# Patient Record
Sex: FEMALE | Race: WHITE | NOT HISPANIC OR LATINO
[De-identification: names, ages, dates, MRNs, and addresses within clinical notes are randomized per-mention and may not be internally consistent; named-entity substitution may affect disease eponyms.]

---

## 2017-03-23 ENCOUNTER — APPOINTMENT (OUTPATIENT)
Dept: OBGYN | Facility: CLINIC | Age: 42
End: 2017-03-23

## 2017-03-23 VITALS
HEIGHT: 65 IN | SYSTOLIC BLOOD PRESSURE: 150 MMHG | BODY MASS INDEX: 29.49 KG/M2 | DIASTOLIC BLOOD PRESSURE: 90 MMHG | WEIGHT: 177 LBS

## 2017-03-28 LAB — HPV HIGH+LOW RISK DNA PNL CVX: NEGATIVE

## 2017-03-30 LAB — CYTOLOGY CVX/VAG DOC THIN PREP: NORMAL

## 2017-05-11 ENCOUNTER — FORM ENCOUNTER (OUTPATIENT)
Age: 42
End: 2017-05-11

## 2017-05-12 ENCOUNTER — APPOINTMENT (OUTPATIENT)
Dept: MAMMOGRAPHY | Facility: CLINIC | Age: 42
End: 2017-05-12

## 2017-05-12 ENCOUNTER — OUTPATIENT (OUTPATIENT)
Dept: OUTPATIENT SERVICES | Facility: HOSPITAL | Age: 42
LOS: 1 days | End: 2017-05-12
Payer: COMMERCIAL

## 2017-05-12 DIAGNOSIS — Z00.8 ENCOUNTER FOR OTHER GENERAL EXAMINATION: ICD-10-CM

## 2017-05-12 PROCEDURE — 77067 SCR MAMMO BI INCL CAD: CPT

## 2017-05-12 PROCEDURE — 77063 BREAST TOMOSYNTHESIS BI: CPT

## 2018-05-11 ENCOUNTER — APPOINTMENT (OUTPATIENT)
Dept: OBGYN | Facility: CLINIC | Age: 43
End: 2018-05-11
Payer: COMMERCIAL

## 2018-05-11 VITALS
WEIGHT: 192 LBS | HEIGHT: 65 IN | SYSTOLIC BLOOD PRESSURE: 112 MMHG | BODY MASS INDEX: 31.99 KG/M2 | DIASTOLIC BLOOD PRESSURE: 78 MMHG

## 2018-05-11 PROCEDURE — 99396 PREV VISIT EST AGE 40-64: CPT

## 2018-05-14 LAB — HPV HIGH+LOW RISK DNA PNL CVX: NOT DETECTED

## 2018-05-16 LAB — CYTOLOGY CVX/VAG DOC THIN PREP: NORMAL

## 2018-05-31 ENCOUNTER — FORM ENCOUNTER (OUTPATIENT)
Age: 43
End: 2018-05-31

## 2018-06-01 ENCOUNTER — APPOINTMENT (OUTPATIENT)
Dept: MAMMOGRAPHY | Facility: CLINIC | Age: 43
End: 2018-06-01

## 2018-06-01 ENCOUNTER — OUTPATIENT (OUTPATIENT)
Dept: OUTPATIENT SERVICES | Facility: HOSPITAL | Age: 43
LOS: 1 days | End: 2018-06-01
Payer: COMMERCIAL

## 2018-06-01 DIAGNOSIS — Z12.31 ENCOUNTER FOR SCREENING MAMMOGRAM FOR MALIGNANT NEOPLASM OF BREAST: ICD-10-CM

## 2018-06-01 PROCEDURE — 77067 SCR MAMMO BI INCL CAD: CPT | Mod: 26

## 2018-06-01 PROCEDURE — 77063 BREAST TOMOSYNTHESIS BI: CPT

## 2018-06-01 PROCEDURE — 77067 SCR MAMMO BI INCL CAD: CPT

## 2018-06-01 PROCEDURE — 77063 BREAST TOMOSYNTHESIS BI: CPT | Mod: 26

## 2019-05-14 ENCOUNTER — APPOINTMENT (OUTPATIENT)
Dept: OBGYN | Facility: CLINIC | Age: 44
End: 2019-05-14
Payer: COMMERCIAL

## 2019-05-14 VITALS
BODY MASS INDEX: 31.49 KG/M2 | DIASTOLIC BLOOD PRESSURE: 70 MMHG | SYSTOLIC BLOOD PRESSURE: 120 MMHG | WEIGHT: 189 LBS | HEIGHT: 65 IN

## 2019-05-14 PROCEDURE — 99396 PREV VISIT EST AGE 40-64: CPT

## 2019-05-14 NOTE — PHYSICAL EXAM
[Awake] : awake [Alert] : alert [Mass] : no breast mass [Acute Distress] : no acute distress [Nipple Discharge] : no nipple discharge [Axillary LAD] : no axillary lymphadenopathy [Soft] : soft [Tender] : non tender [Oriented x3] : oriented to person, place, and time [Normal] : uterus [No Bleeding] : there was no active vaginal bleeding [Uterine Adnexae] : were not tender and not enlarged [RRR, No Murmurs] : RRR, no murmurs [CTAB] : CTAB

## 2019-05-15 LAB — HPV HIGH+LOW RISK DNA PNL CVX: NOT DETECTED

## 2019-05-20 LAB — CYTOLOGY CVX/VAG DOC THIN PREP: NORMAL

## 2019-06-12 ENCOUNTER — FORM ENCOUNTER (OUTPATIENT)
Age: 44
End: 2019-06-12

## 2019-06-13 ENCOUNTER — OUTPATIENT (OUTPATIENT)
Dept: OUTPATIENT SERVICES | Facility: HOSPITAL | Age: 44
LOS: 1 days | End: 2019-06-13
Payer: COMMERCIAL

## 2019-06-13 ENCOUNTER — APPOINTMENT (OUTPATIENT)
Dept: MAMMOGRAPHY | Facility: CLINIC | Age: 44
End: 2019-06-13
Payer: COMMERCIAL

## 2019-06-13 DIAGNOSIS — Z12.31 ENCOUNTER FOR SCREENING MAMMOGRAM FOR MALIGNANT NEOPLASM OF BREAST: ICD-10-CM

## 2019-06-13 PROCEDURE — 77067 SCR MAMMO BI INCL CAD: CPT

## 2019-06-13 PROCEDURE — 77067 SCR MAMMO BI INCL CAD: CPT | Mod: 26

## 2019-06-13 PROCEDURE — 77063 BREAST TOMOSYNTHESIS BI: CPT

## 2019-06-13 PROCEDURE — 77063 BREAST TOMOSYNTHESIS BI: CPT | Mod: 26

## 2020-07-27 ENCOUNTER — APPOINTMENT (OUTPATIENT)
Dept: OBGYN | Facility: CLINIC | Age: 45
End: 2020-07-27
Payer: COMMERCIAL

## 2020-07-27 VITALS
SYSTOLIC BLOOD PRESSURE: 120 MMHG | BODY MASS INDEX: 31.49 KG/M2 | HEIGHT: 65 IN | WEIGHT: 189 LBS | DIASTOLIC BLOOD PRESSURE: 70 MMHG

## 2020-07-27 DIAGNOSIS — B37.2 CANDIDIASIS OF SKIN AND NAIL: ICD-10-CM

## 2020-07-27 PROCEDURE — 99396 PREV VISIT EST AGE 40-64: CPT

## 2020-07-27 PROCEDURE — 99214 OFFICE O/P EST MOD 30 MIN: CPT | Mod: 25

## 2020-07-27 RX ORDER — CLOTRIMAZOLE AND BETAMETHASONE DIPROPIONATE 10; .5 MG/G; MG/G
1-0.05 CREAM TOPICAL TWICE DAILY
Qty: 1 | Refills: 1 | Status: ACTIVE | COMMUNITY
Start: 2020-07-27 | End: 1900-01-01

## 2020-07-27 NOTE — PHYSICAL EXAM
[Acute Distress] : no acute distress [Awake] : awake [Alert] : alert [Mass] : no breast mass [Tender] : non tender [Axillary LAD] : no axillary lymphadenopathy [Soft] : soft [Nipple Discharge] : no nipple discharge [Oriented x3] : oriented to person, place, and time [Normal] : cervix [No Bleeding] : there was no active vaginal bleeding [Uterine Adnexae] : were not tender and not enlarged [CTAB] : CTAB [RRR, No Murmurs] : RRR, no murmurs

## 2020-07-30 LAB — HPV HIGH+LOW RISK DNA PNL CVX: NOT DETECTED

## 2020-08-06 LAB — CYTOLOGY CVX/VAG DOC THIN PREP: NORMAL

## 2020-08-20 ENCOUNTER — APPOINTMENT (OUTPATIENT)
Dept: OBGYN | Facility: CLINIC | Age: 45
End: 2020-08-20
Payer: COMMERCIAL

## 2020-08-20 ENCOUNTER — ASOB RESULT (OUTPATIENT)
Age: 45
End: 2020-08-20

## 2020-08-20 VITALS
WEIGHT: 193 LBS | DIASTOLIC BLOOD PRESSURE: 87 MMHG | SYSTOLIC BLOOD PRESSURE: 147 MMHG | HEIGHT: 65 IN | BODY MASS INDEX: 32.15 KG/M2

## 2020-08-20 DIAGNOSIS — N84.0 POLYP OF CORPUS UTERI: ICD-10-CM

## 2020-08-20 PROCEDURE — 76830 TRANSVAGINAL US NON-OB: CPT

## 2020-08-20 PROCEDURE — 76857 US EXAM PELVIC LIMITED: CPT | Mod: 59

## 2020-08-20 PROCEDURE — 58558Z: CUSTOM

## 2020-08-20 PROCEDURE — 99213 OFFICE O/P EST LOW 20 MIN: CPT | Mod: 25

## 2020-08-20 NOTE — PHYSICAL EXAM
[Normal] : cervix [No Bleeding] : there was no active vaginal bleeding [Uterine Adnexae] : were not tender and not enlarged [Enlarged ___ wks] : enlarged [unfilled] ~Uweeks

## 2020-08-20 NOTE — PROCEDURE
[Risks] : risks [Endometrial Biopsy] : Endometrial biopsy [Benefits] : benefits [Patient] : patient [Alternatives] : alternatives [Infection] : infection [Bleeding] : bleeding [Uterine Perforation] : uterine perforation [Allergic Reaction] : allergic reaction [Pain] : pain [CONSENT OBTAINED] : written consent was obtained prior to the procedure. [Neg Pregnancy Test] : a pregnancy test was negative [Paracervical Block] : A paracervical block was performed using [Betadine] : Betadine [1%] : 1% [Without Epi] : without epinephrine [Anteverted] : anteverted [Pipelle] : a Pipelle endometrial suction curette [Tenaculum] : a single toothed tenaculum [Tolerated Well] : the patient tolerated the procedure well [Sent to Histology] : the specimen was place in buffered formalin and sent for pathlogy [No Complications] : there were no complications

## 2020-09-01 LAB — CORE LAB BIOPSY: NORMAL

## 2020-09-28 ENCOUNTER — APPOINTMENT (OUTPATIENT)
Dept: ULTRASOUND IMAGING | Facility: CLINIC | Age: 45
End: 2020-09-28
Payer: COMMERCIAL

## 2020-09-28 ENCOUNTER — APPOINTMENT (OUTPATIENT)
Dept: MAMMOGRAPHY | Facility: CLINIC | Age: 45
End: 2020-09-28
Payer: COMMERCIAL

## 2020-09-28 ENCOUNTER — RESULT REVIEW (OUTPATIENT)
Age: 45
End: 2020-09-28

## 2020-09-28 ENCOUNTER — OUTPATIENT (OUTPATIENT)
Dept: OUTPATIENT SERVICES | Facility: HOSPITAL | Age: 45
LOS: 1 days | End: 2020-09-28
Payer: COMMERCIAL

## 2020-09-28 DIAGNOSIS — R92.8 OTHER ABNORMAL AND INCONCLUSIVE FINDINGS ON DIAGNOSTIC IMAGING OF BREAST: ICD-10-CM

## 2020-09-28 PROCEDURE — 77063 BREAST TOMOSYNTHESIS BI: CPT

## 2020-09-28 PROCEDURE — 77063 BREAST TOMOSYNTHESIS BI: CPT | Mod: 26

## 2020-09-28 PROCEDURE — 77067 SCR MAMMO BI INCL CAD: CPT | Mod: 26

## 2020-09-28 PROCEDURE — 77067 SCR MAMMO BI INCL CAD: CPT

## 2021-06-04 ENCOUNTER — NON-APPOINTMENT (OUTPATIENT)
Age: 46
End: 2021-06-04

## 2021-06-04 ENCOUNTER — APPOINTMENT (OUTPATIENT)
Dept: FAMILY MEDICINE | Facility: CLINIC | Age: 46
End: 2021-06-04
Payer: COMMERCIAL

## 2021-06-04 PROCEDURE — 99202 OFFICE O/P NEW SF 15 MIN: CPT | Mod: 95

## 2021-06-04 NOTE — HISTORY OF PRESENT ILLNESS
[Home] : at home, [unfilled] , at the time of the visit. [Medical Office: ([facility])___] : at the medical office located in  [Verbal consent obtained from patient] : the patient, [unfilled] [FreeTextEntry8] : Patient states that she has been having difficulty coping with the stressors of 3 children, a full time job, and managing the household. Patient has been seeing psych NP weekly, and getting her medications adjusted. states she feels well physically, but is considering taking time from work

## 2021-11-02 ENCOUNTER — APPOINTMENT (OUTPATIENT)
Dept: OBGYN | Facility: CLINIC | Age: 46
End: 2021-11-02
Payer: COMMERCIAL

## 2021-11-02 VITALS
WEIGHT: 188 LBS | BODY MASS INDEX: 31.32 KG/M2 | HEIGHT: 65 IN | SYSTOLIC BLOOD PRESSURE: 140 MMHG | DIASTOLIC BLOOD PRESSURE: 84 MMHG

## 2021-11-02 DIAGNOSIS — R92.2 INCONCLUSIVE MAMMOGRAM: ICD-10-CM

## 2021-11-02 PROCEDURE — 99396 PREV VISIT EST AGE 40-64: CPT

## 2021-11-03 NOTE — DISCUSSION/SUMMARY
[FreeTextEntry1] : annual exam. pap/hpv done\par mother had br ca  in 40s, pts tyrer cuscick risk is elevated. dw pt considertion of yearly screening mris and poss risk contrast for mri. Pt declines mri for now, will get mammo and us for dense breast tissue. \par had isolated episode midcycle vb, advised to rto if she has anymore for us, endo bx .

## 2021-11-03 NOTE — HISTORY OF PRESENT ILLNESS
[FreeTextEntry1] : 45 yo p3 here for annual exam. no gyn co. periods monthly, not as heavy. had one episode of midcycle vb several months ago.

## 2021-11-05 LAB — HPV HIGH+LOW RISK DNA PNL CVX: NOT DETECTED

## 2021-11-09 LAB — CYTOLOGY CVX/VAG DOC THIN PREP: ABNORMAL

## 2021-12-06 ENCOUNTER — APPOINTMENT (OUTPATIENT)
Dept: MAMMOGRAPHY | Facility: CLINIC | Age: 46
End: 2021-12-06
Payer: COMMERCIAL

## 2021-12-06 ENCOUNTER — RESULT REVIEW (OUTPATIENT)
Age: 46
End: 2021-12-06

## 2021-12-06 ENCOUNTER — TRANSCRIPTION ENCOUNTER (OUTPATIENT)
Age: 46
End: 2021-12-06

## 2021-12-06 ENCOUNTER — OUTPATIENT (OUTPATIENT)
Dept: OUTPATIENT SERVICES | Facility: HOSPITAL | Age: 46
LOS: 1 days | End: 2021-12-06
Payer: COMMERCIAL

## 2021-12-06 ENCOUNTER — APPOINTMENT (OUTPATIENT)
Dept: ULTRASOUND IMAGING | Facility: CLINIC | Age: 46
End: 2021-12-06
Payer: COMMERCIAL

## 2021-12-06 DIAGNOSIS — Z00.00 ENCOUNTER FOR GENERAL ADULT MEDICAL EXAMINATION WITHOUT ABNORMAL FINDINGS: ICD-10-CM

## 2021-12-06 DIAGNOSIS — R92.2 INCONCLUSIVE MAMMOGRAM: ICD-10-CM

## 2021-12-06 PROCEDURE — 77063 BREAST TOMOSYNTHESIS BI: CPT | Mod: 26

## 2021-12-06 PROCEDURE — 77067 SCR MAMMO BI INCL CAD: CPT

## 2021-12-06 PROCEDURE — 77063 BREAST TOMOSYNTHESIS BI: CPT

## 2021-12-06 PROCEDURE — 77067 SCR MAMMO BI INCL CAD: CPT | Mod: 26

## 2021-12-06 PROCEDURE — 76641 ULTRASOUND BREAST COMPLETE: CPT

## 2021-12-06 PROCEDURE — 76641 ULTRASOUND BREAST COMPLETE: CPT | Mod: 26,50

## 2022-01-20 ENCOUNTER — RESULT REVIEW (OUTPATIENT)
Age: 47
End: 2022-01-20

## 2022-01-21 ENCOUNTER — APPOINTMENT (OUTPATIENT)
Dept: DERMATOLOGY | Facility: CLINIC | Age: 47
End: 2022-01-21
Payer: COMMERCIAL

## 2022-01-21 PROCEDURE — 11306 SHAVE SKIN LESION 0.6-1.0 CM: CPT

## 2022-01-21 PROCEDURE — 99202 OFFICE O/P NEW SF 15 MIN: CPT | Mod: 25

## 2022-01-21 PROCEDURE — 11900 INJECT SKIN LESIONS </W 7: CPT | Mod: 59

## 2022-03-06 ENCOUNTER — RESULT REVIEW (OUTPATIENT)
Age: 47
End: 2022-03-06

## 2022-03-07 ENCOUNTER — APPOINTMENT (OUTPATIENT)
Dept: DERMATOLOGY | Facility: CLINIC | Age: 47
End: 2022-03-07
Payer: COMMERCIAL

## 2022-03-07 ENCOUNTER — NON-APPOINTMENT (OUTPATIENT)
Age: 47
End: 2022-03-07

## 2022-03-07 DIAGNOSIS — D48.5 NEOPLASM OF UNCERTAIN BEHAVIOR OF SKIN: ICD-10-CM

## 2022-03-07 DIAGNOSIS — L72.0 EPIDERMAL CYST: ICD-10-CM

## 2022-03-07 PROCEDURE — 12042 INTMD RPR N-HF/GENIT2.6-7.5: CPT

## 2022-03-07 PROCEDURE — 11423 EXC H-F-NK-SP B9+MARG 2.1-3: CPT

## 2022-03-15 ENCOUNTER — NON-APPOINTMENT (OUTPATIENT)
Age: 47
End: 2022-03-15

## 2022-05-01 ENCOUNTER — INPATIENT (INPATIENT)
Facility: HOSPITAL | Age: 47
LOS: 2 days | Discharge: HOME | DRG: 918 | End: 2022-05-04
Attending: STUDENT IN AN ORGANIZED HEALTH CARE EDUCATION/TRAINING PROGRAM | Admitting: HOSPITALIST
Payer: COMMERCIAL

## 2022-05-01 VITALS
RESPIRATION RATE: 18 BRPM | DIASTOLIC BLOOD PRESSURE: 124 MMHG | WEIGHT: 181.66 LBS | OXYGEN SATURATION: 100 % | TEMPERATURE: 99 F | HEART RATE: 98 BPM | HEIGHT: 65 IN | SYSTOLIC BLOOD PRESSURE: 180 MMHG

## 2022-05-01 DIAGNOSIS — T56.891A TOXIC EFFECT OF OTHER METALS, ACCIDENTAL (UNINTENTIONAL), INITIAL ENCOUNTER: ICD-10-CM

## 2022-05-01 DIAGNOSIS — Z98.890 OTHER SPECIFIED POSTPROCEDURAL STATES: Chronic | ICD-10-CM

## 2022-05-01 LAB
ALBUMIN SERPL ELPH-MCNC: 4.7 G/DL — SIGNIFICANT CHANGE UP (ref 3.3–5.2)
ALP SERPL-CCNC: 76 U/L — SIGNIFICANT CHANGE UP (ref 40–120)
ALT FLD-CCNC: 14 U/L — SIGNIFICANT CHANGE UP
ANION GAP SERPL CALC-SCNC: 12 MMOL/L — SIGNIFICANT CHANGE UP (ref 5–17)
ANION GAP SERPL CALC-SCNC: 9 MMOL/L — SIGNIFICANT CHANGE UP (ref 5–17)
APAP SERPL-MCNC: <3 UG/ML — LOW (ref 10–26)
APPEARANCE UR: CLEAR — SIGNIFICANT CHANGE UP
AST SERPL-CCNC: 17 U/L — SIGNIFICANT CHANGE UP
BACTERIA # UR AUTO: ABNORMAL
BILIRUB SERPL-MCNC: 0.4 MG/DL — SIGNIFICANT CHANGE UP (ref 0.4–2)
BILIRUB UR-MCNC: NEGATIVE — SIGNIFICANT CHANGE UP
BUN SERPL-MCNC: 11.3 MG/DL — SIGNIFICANT CHANGE UP (ref 8–20)
BUN SERPL-MCNC: 14.6 MG/DL — SIGNIFICANT CHANGE UP (ref 8–20)
CALCIUM SERPL-MCNC: 11.1 MG/DL — HIGH (ref 8.6–10.2)
CALCIUM SERPL-MCNC: 9.8 MG/DL — SIGNIFICANT CHANGE UP (ref 8.6–10.2)
CHLORIDE SERPL-SCNC: 104 MMOL/L — SIGNIFICANT CHANGE UP (ref 98–107)
CHLORIDE SERPL-SCNC: 110 MMOL/L — HIGH (ref 98–107)
CHLORIDE UR-SCNC: 28 MMOL/L — SIGNIFICANT CHANGE UP
CO2 SERPL-SCNC: 23 MMOL/L — SIGNIFICANT CHANGE UP (ref 22–29)
CO2 SERPL-SCNC: 23 MMOL/L — SIGNIFICANT CHANGE UP (ref 22–29)
COLOR SPEC: YELLOW — SIGNIFICANT CHANGE UP
CREAT ?TM UR-MCNC: 70 MG/DL — SIGNIFICANT CHANGE UP
CREAT SERPL-MCNC: 0.9 MG/DL — SIGNIFICANT CHANGE UP (ref 0.5–1.3)
CREAT SERPL-MCNC: 1.02 MG/DL — SIGNIFICANT CHANGE UP (ref 0.5–1.3)
DIFF PNL FLD: NEGATIVE — SIGNIFICANT CHANGE UP
EGFR: 68 ML/MIN/1.73M2 — SIGNIFICANT CHANGE UP
EGFR: 79 ML/MIN/1.73M2 — SIGNIFICANT CHANGE UP
EPI CELLS # UR: SIGNIFICANT CHANGE UP
GLUCOSE SERPL-MCNC: 109 MG/DL — HIGH (ref 70–99)
GLUCOSE SERPL-MCNC: 83 MG/DL — SIGNIFICANT CHANGE UP (ref 70–99)
GLUCOSE UR QL: NEGATIVE MG/DL — SIGNIFICANT CHANGE UP
HCT VFR BLD CALC: 46.4 % — HIGH (ref 34.5–45)
HGB BLD-MCNC: 14.2 G/DL — SIGNIFICANT CHANGE UP (ref 11.5–15.5)
KETONES UR-MCNC: NEGATIVE — SIGNIFICANT CHANGE UP
LEUKOCYTE ESTERASE UR-ACNC: ABNORMAL
LITHIUM SERPL-MCNC: 1.27 MMOL/L — SIGNIFICANT CHANGE UP (ref 0.5–1.5)
LITHIUM SERPL-MCNC: 1.43 MMOL/L — SIGNIFICANT CHANGE UP (ref 0.5–1.5)
LITHIUM SERPL-MCNC: 1.62 MMOL/L — CRITICAL HIGH (ref 0.5–1.5)
MCHC RBC-ENTMCNC: 27.8 PG — SIGNIFICANT CHANGE UP (ref 27–34)
MCHC RBC-ENTMCNC: 30.6 GM/DL — LOW (ref 32–36)
MCV RBC AUTO: 90.8 FL — SIGNIFICANT CHANGE UP (ref 80–100)
NITRITE UR-MCNC: NEGATIVE — SIGNIFICANT CHANGE UP
OSMOLALITY UR: 269 MOSM/KG — LOW (ref 300–1000)
PH UR: 6.5 — SIGNIFICANT CHANGE UP (ref 5–8)
PLATELET # BLD AUTO: 230 K/UL — SIGNIFICANT CHANGE UP (ref 150–400)
POTASSIUM SERPL-MCNC: 3.8 MMOL/L — SIGNIFICANT CHANGE UP (ref 3.5–5.3)
POTASSIUM SERPL-MCNC: 3.9 MMOL/L — SIGNIFICANT CHANGE UP (ref 3.5–5.3)
POTASSIUM SERPL-SCNC: 3.8 MMOL/L — SIGNIFICANT CHANGE UP (ref 3.5–5.3)
POTASSIUM SERPL-SCNC: 3.9 MMOL/L — SIGNIFICANT CHANGE UP (ref 3.5–5.3)
POTASSIUM UR-SCNC: 16 MMOL/L — SIGNIFICANT CHANGE UP
PROT ?TM UR-MCNC: 9 MG/DL — SIGNIFICANT CHANGE UP (ref 0–12)
PROT SERPL-MCNC: 7.9 G/DL — SIGNIFICANT CHANGE UP (ref 6.6–8.7)
PROT UR-MCNC: NEGATIVE — SIGNIFICANT CHANGE UP
PROT/CREAT UR-RTO: 0.1 RATIO — SIGNIFICANT CHANGE UP
RBC # BLD: 5.11 M/UL — SIGNIFICANT CHANGE UP (ref 3.8–5.2)
RBC # FLD: 19.7 % — HIGH (ref 10.3–14.5)
RBC CASTS # UR COMP ASSIST: NEGATIVE /HPF — SIGNIFICANT CHANGE UP (ref 0–4)
SALICYLATES SERPL-MCNC: <0.6 MG/DL — LOW (ref 10–20)
SODIUM SERPL-SCNC: 139 MMOL/L — SIGNIFICANT CHANGE UP (ref 135–145)
SODIUM SERPL-SCNC: 142 MMOL/L — SIGNIFICANT CHANGE UP (ref 135–145)
SODIUM UR-SCNC: <30 MMOL/L — SIGNIFICANT CHANGE UP
SP GR SPEC: 1.01 — SIGNIFICANT CHANGE UP (ref 1.01–1.02)
TSH SERPL-MCNC: 1.28 UIU/ML — SIGNIFICANT CHANGE UP (ref 0.27–4.2)
UROBILINOGEN FLD QL: NEGATIVE MG/DL — SIGNIFICANT CHANGE UP
WBC # BLD: 6.46 K/UL — SIGNIFICANT CHANGE UP (ref 3.8–10.5)
WBC # FLD AUTO: 6.46 K/UL — SIGNIFICANT CHANGE UP (ref 3.8–10.5)
WBC UR QL: SIGNIFICANT CHANGE UP /HPF (ref 0–5)

## 2022-05-01 PROCEDURE — 99255 IP/OBS CONSLTJ NEW/EST HI 80: CPT

## 2022-05-01 PROCEDURE — 93010 ELECTROCARDIOGRAM REPORT: CPT

## 2022-05-01 PROCEDURE — 99285 EMERGENCY DEPT VISIT HI MDM: CPT

## 2022-05-01 PROCEDURE — 99223 1ST HOSP IP/OBS HIGH 75: CPT

## 2022-05-01 RX ORDER — SOD SULF/SODIUM/NAHCO3/KCL/PEG
4000 SOLUTION, RECONSTITUTED, ORAL ORAL ONCE
Refills: 0 | Status: COMPLETED | OUTPATIENT
Start: 2022-05-01 | End: 2022-05-01

## 2022-05-01 RX ORDER — LURASIDONE HYDROCHLORIDE 40 MG/1
0 TABLET ORAL
Qty: 0 | Refills: 0 | DISCHARGE

## 2022-05-01 RX ORDER — LANOLIN ALCOHOL/MO/W.PET/CERES
3 CREAM (GRAM) TOPICAL AT BEDTIME
Refills: 0 | Status: DISCONTINUED | OUTPATIENT
Start: 2022-05-01 | End: 2022-05-04

## 2022-05-01 RX ORDER — SERTRALINE 25 MG/1
0 TABLET, FILM COATED ORAL
Qty: 0 | Refills: 0 | DISCHARGE

## 2022-05-01 RX ORDER — ACETAMINOPHEN 500 MG
650 TABLET ORAL EVERY 6 HOURS
Refills: 0 | Status: DISCONTINUED | OUTPATIENT
Start: 2022-05-01 | End: 2022-05-04

## 2022-05-01 RX ORDER — LOSARTAN POTASSIUM 100 MG/1
50 TABLET, FILM COATED ORAL DAILY
Refills: 0 | Status: DISCONTINUED | OUTPATIENT
Start: 2022-05-01 | End: 2022-05-04

## 2022-05-01 RX ORDER — SODIUM CHLORIDE 9 MG/ML
2000 INJECTION INTRAMUSCULAR; INTRAVENOUS; SUBCUTANEOUS ONCE
Refills: 0 | Status: COMPLETED | OUTPATIENT
Start: 2022-05-01 | End: 2022-05-01

## 2022-05-01 RX ORDER — LITHIUM CARBONATE 300 MG/1
0 TABLET, EXTENDED RELEASE ORAL
Qty: 0 | Refills: 0 | DISCHARGE

## 2022-05-01 RX ORDER — ONDANSETRON 8 MG/1
4 TABLET, FILM COATED ORAL EVERY 8 HOURS
Refills: 0 | Status: DISCONTINUED | OUTPATIENT
Start: 2022-05-01 | End: 2022-05-04

## 2022-05-01 RX ORDER — SODIUM CHLORIDE 9 MG/ML
1000 INJECTION INTRAMUSCULAR; INTRAVENOUS; SUBCUTANEOUS
Refills: 0 | Status: DISCONTINUED | OUTPATIENT
Start: 2022-05-01 | End: 2022-05-04

## 2022-05-01 RX ORDER — SERTRALINE 25 MG/1
75 TABLET, FILM COATED ORAL DAILY
Refills: 0 | Status: DISCONTINUED | OUTPATIENT
Start: 2022-05-01 | End: 2022-05-04

## 2022-05-01 RX ORDER — LURASIDONE HYDROCHLORIDE 40 MG/1
20 TABLET ORAL DAILY
Refills: 0 | Status: DISCONTINUED | OUTPATIENT
Start: 2022-05-01 | End: 2022-05-04

## 2022-05-01 RX ADMIN — SODIUM CHLORIDE 2000 MILLILITER(S): 9 INJECTION INTRAMUSCULAR; INTRAVENOUS; SUBCUTANEOUS at 13:07

## 2022-05-01 RX ADMIN — SODIUM CHLORIDE 200 MILLILITER(S): 9 INJECTION INTRAMUSCULAR; INTRAVENOUS; SUBCUTANEOUS at 18:05

## 2022-05-01 RX ADMIN — Medication 4000 MILLILITER(S): at 12:35

## 2022-05-01 RX ADMIN — Medication 3 MILLIGRAM(S): at 22:53

## 2022-05-01 RX ADMIN — SODIUM CHLORIDE 2000 MILLILITER(S): 9 INJECTION INTRAMUSCULAR; INTRAVENOUS; SUBCUTANEOUS at 12:07

## 2022-05-01 RX ADMIN — SODIUM CHLORIDE 200 MILLILITER(S): 9 INJECTION INTRAMUSCULAR; INTRAVENOUS; SUBCUTANEOUS at 22:53

## 2022-05-01 NOTE — ED PROVIDER NOTE - NS ED ROS FT
CONSTITUTIONAL: No fevers, no chills  Eyes: No vision changes  Cardiovascular: No Chest pain  Respiratory: No SOB  Gastrointestinal: No n/v/c/d, no abd pain  Genitourinary: no dysuria, no hematuria  SKIN: no rashes.  MSK: no weakness, no myalgias, no arthralgias  NEURO: no headache, no weakness, no numbness  PSYCHIATRIC: +SI, no HI

## 2022-05-01 NOTE — ED PROVIDER NOTE - ATTENDING CONTRIBUTION TO CARE
46yoF; with PMH signif for Bipolar; now p/w suicidal attempt. patietn states she felt upset and took 17 tablets of 600mg Lithium Carbonate at attempt to harm herself.  states she felt extremely depressed and today the severity of depression made her want to hurt herself and "just end it all".  reports feeling tremulous. denies f/c/s. denies cp/sob/palp. c/o nausea. denies abd pain.   General:     NAD  Head:     NC/AT, EOMI, oral mucosa moist  Neck:     trachea midline  Lungs:     CTA b/l, no w/r/r  CVS:     S1S2, RRR, no m/g/r  Abd:     +BS, s/nt/nd, no organomegaly  Ext:    2+ radial and pedal pulses, no c/c/e  Neuro: AAOx3, no sensory/motor deficits  A/P:  46yoF p/w suicidal attempt with Lithium   -labs, ekg, 46yoF; with PMH signif for Bipolar; now p/w suicidal attempt. patietn states she felt upset and took 17 tablets of 600mg Lithium Carbonate at attempt to harm herself.  states she felt extremely depressed and today the severity of depression made her want to hurt herself and "just end it all".  reports feeling tremulous. denies f/c/s. denies cp/sob/palp. c/o nausea. denies abd pain.   General:     NAD  Head:     NC/AT, EOMI, oral mucosa moist  Neck:     trachea midline  Lungs:     CTA b/l, no w/r/r  CVS:     S1S2, RRR, no m/g/r  Abd:     +BS, s/nt/nd, no organomegaly  Ext:    2+ radial and pedal pulses, no c/c/e  Neuro: AAOx3, no sensory/motor deficits  A/P:  46yoF p/w suicidal attempt with Lithium   -labs, ekg, cardiac monitor, toxicology consult  -psych consult, 1:1

## 2022-05-01 NOTE — ED ADULT NURSE NOTE - PHONE #
----- Message from Syed Solis MD sent at 3/22/2021  8:38 PM CDT -----  No diabetes.  Total cholesterol is higher but that is only due to his HDL being higher.  Overall chol improved.  Same labs one year.  
My Saffell msg sent to pt:    Armando-   reviewed your test results and said you do not have diabetes. Your total cholesterol is higher but that is only due to your HDL (good cholesterol) being higher.  Overall cholesterol is improved.    He would like you to have the same labs checked in one year.  Thank you-  The staff of   ------------------  Orders placed  
[phone]

## 2022-05-01 NOTE — ED PROVIDER NOTE - CARE PLAN
1 Principal Discharge DX:	Lithium overdose   Principal Discharge DX:	Lithium overdose  Secondary Diagnosis:	Suicidal ideation

## 2022-05-01 NOTE — H&P ADULT - ASSESSMENT
47 y/o female with intentional lithium OD, lithium toxicity, Bipolar w/ suicidal ideation, Hx of HTN    Lithium OD:  -Cont on tele  -Cont. aggressive IV hydration  -s/p Bowel prep and reports now with clear liquid BMs  -Monitor electrolytes  -Toxicology/Nephrology consults noted  -Cont. to trend Lithium levels Q 6 hours until peaks, call to Nephrology if reaches 4.   -Check utox  -OOB, ambulate, DVT-P w/ VC boots     Bipolar Disorder with SI attempt:  -Maintain on 1:1  -Cont. Stefano Louise  -Await  consult-called by ED    HTN:  -DASH diet  -Cont. Losartan     Discussed with ED staff, Patient

## 2022-05-01 NOTE — ED ADULT NURSE REASSESSMENT NOTE - NS ED NURSE REASSESS COMMENT FT1
pt with multiple episodes of bradycardia and vomiting, Dr. Alicia aware, rhythm strips printed and given to MD.

## 2022-05-01 NOTE — ED ADULT NURSE NOTE - OBJECTIVE STATEMENT
pt received in critical care stating that she took 17 lithium pills today in an attempt to hurt herself.  pt states she has been thinking about doing this for weeks prior to consuming today.  pt with one episode of vomiting at this time.  resp even and unlabored.  abd soft, nontender, nondistended.  moving all extremities well and with purpose.

## 2022-05-01 NOTE — CONSULT NOTE ADULT - ASSESSMENT
DX : Lithium Poisoning,     Hypercalcemia,     Obtain serum lithium concentration upon presentation and repeat every 2 to 4 hours during initial management; normal range 0.8 and 1.2 mEq/L (mmol/L); drug concentration may not correlate with clinical severity.   Obtain the following blood tests: fingerstick glucose; basic electrolytes (including Na, K, Cl, HCO3); creatinine and BUN; complete blood count (hemoglobin, platelets, white blood cells);   acetaminophen and salicylate concentrations (with suicide attempt or possible co ingestion);    Obtain an electrocardiogram.   Management Assess and stabilize airway, breathing, and circulation- NA Here.     Restore sodium and water balance: In adults, initiate IV hydration with isotonic saline at twice the maintenance rate for a total of approximately 2 to 3 L, depending upon the patient's fluid status and cardiac function Monitor serum sodium closely (every 4 to 6 hours) in patients with lithium-induced nephrogenic diabetes insipidus Give whole bowel irrigation with polyethylene glycol (PEG) solution to AWAKE ASYMPTOMATIC patients who present within 2 to 3 hours after an ingestion of more than 10 to 15 tablets of sustained-release lithium. The dose of PEG is 500 mL to 2 L of PEG per hour via nasogastric tube until the rectal effluent is clear. Obtain nephrology consultation early for significant lithium poisoning, prior to the development of obvious indications for hemodialysis.     Perform hemodialysis for the following indications: Serum lithium concentration is greater than 4 mEq/L (or mmol/L), regardless of the clinical status of the patient Serum lithium concentration is greater than 2.5 mEq/L (or mmol/L) and the patient manifests signs of significant lithium toxicity (eg, seizures, depressed mental status), has renal insufficiency or other conditions that limit lithium excretion, or suffers from an illness that would be exacerbated by aggressive IV fluid hydration (eg, heart failure)    D/W  by side * Dr. Irvin,   
45 yo female with pmhx BPD on lithium presenting with lithium ingestion 1 hour prior to arrival. Suggestive of acute lithium ingestion with early acute lithium toxicity. Lithium is absorbed in blood within one hour, then fully distributes in body by 6 hours. Acute toxicity may show GI symptoms (N/V abdominal discomfort), but may also show tremors, mental status changes, and difficulty walking.    -Recommend aggressive IVF resuscitation (2L, then maintenance IVF at 2x rate)  -Recommend obtain lithium level now, and repeat level at 6 hours (once lithium fully distributed into body)  -Obtain tox screening labs  -Recommend GI decontamination with whole bowel irrigation (1-2L of GoLytely per hour until, either PO or via NGT, until rectal effluent is clear)  -Recommend at least 6 hour observation, monitoring symptoms. If patient persistently is having GI symptoms at 6 hours, or lithium level still elevated, would recommend medical admission for further trending of lithium levels q6hour and continued IVF resuscitation  -Once lithium level is within normal limits and patient is asymptomatic, can be cleared from toxicologic perspective at that time    Joshua Miguel MD  Toxicology Fellow

## 2022-05-01 NOTE — ED ADULT NURSE REASSESSMENT NOTE - NS ED NURSE REASSESS COMMENT FT1
pt continues to remain AOX3 calm and cooperative, IV site patent, 1:1 remains at bedside. pt ambulatory to bathroom with no difficulties. pt with even and unlabored resps present. NSR on monitor with stable vital signs. awaiting repeat lab results, will continue to monitor closely.

## 2022-05-01 NOTE — ED PROVIDER NOTE - OBJECTIVE STATEMENT
Pt is a 45 y/o F w/PMHx Bipolar presents c/o suicide attempt. Pt states she took 167 - 600mg Li in an attempt to harm herself.  Pt states she has been feeling depressed for sometime, but today is the first time she acted on her feelings.  Pt states she just wants it all to end.  Denies any co-ingestion.  Pt endorses "jitters," denies headache, chest pain, shortness of breath, abdominal pain. Pt is a 47 y/o F w/PMHx Bipolar presents c/o suicide attempt. Pt states she took 17 - 600mg Li in an attempt to harm herself.  Pt states she has been feeling depressed for sometime, but today is the first time she acted on her feelings.  Pt states she just wants it all to end.  Denies any co-ingestion.  Pt endorses "jitters," denies headache, chest pain, shortness of breath, abdominal pain.

## 2022-05-01 NOTE — ED PROVIDER NOTE - NSICDXPASTMEDICALHX_GEN_ALL_CORE_FT
Writer left a message for patient regarding program attendance for today.  In writers message writer asked that patient return writers call when receiving writers message.    
PAST MEDICAL HISTORY:  Bipolar illness

## 2022-05-01 NOTE — CONSULT NOTE ADULT - SUBJECTIVE AND OBJECTIVE BOX
MEDICAL TOXICOLOGY CONSULT    HPI:  45 yo female with pmhx BPD on lithium presenting with lithium ingestion 1 hour prior to arrival. Took 17 600mg lithium tablets at 10:45am. Currently having active vomiting. Denies co-ingestants.    HR 98 /124 RR 18 Temp 99.4F O2 100% on RA  EKG: HR 98 QRS 88 QTc 490    ONSET / TIME of exposure(s): <1 hour prior to arrival    QUANTITY of exposure(s): 17 600mg lithium carbonate    ROUTE of exposure:  __ingestion_ (INGESTION, DERMAL, INHALATION, or UNKNOWN)    CONTEXT of exposure: _home__ (at home, found down on street, found wandering by EMS)    ASSOCIATED symptoms: nausea and vomiting    PAST MEDICAL & SURGICAL HISTORY:  Bipolar illness    H/O hernia repair        MEDICATION HISTORY:  polyethylene glycol/electrolyte Solution. 4000 milliLiter(s) Oral once  sodium chloride 0.9% Bolus 2000 milliLiter(s) IV Bolus once  sodium chloride 0.9%. 1000 milliLiter(s) IV Continuous <Continuous>      FAMILY HISTORY:      REVIEW OF SYSTEMS:   _____unable to perform due to intoxication, dementia, or illness  All systems negative except per HPI    Vital Signs Last 24 Hrs  T(C): 37.4 (01 May 2022 11:09), Max: 37.4 (01 May 2022 11:09)  T(F): 99.4 (01 May 2022 11:09), Max: 99.4 (01 May 2022 11:09)  HR: 98 (01 May 2022 11:09) (98 - 98)  BP: 180/124 (01 May 2022 11:09) (180/124 - 180/124)  BP(mean): --  RR: 18 (01 May 2022 11:09) (18 - 18)  SpO2: 100% (01 May 2022 11:09) (100% - 100%)    SIGNIFICANT LABORATORY STUDIES:                        14.2   6.46  )-----------( 230      ( 01 May 2022 11:34 )             46.4                         
Patient is a 46y old  Female who presents with a chief complaint of     HPI:  overdose  pt states she tried to kill herself by taking 17 pills of Lithium Carbonate 600 mg took at 1045 am  A&Ox3, resp wnl, states she has been dealing with a lot of things, was + Covid Monday finished quarantine yesterday    On Li X 20 Years, Never hospitalized w. Li Toxicity,     Above Reviewed, D/W Dr. Irvin &  by side.     PAST MEDICAL & SURGICAL HISTORY:    Bipolar illness    H/O hernia repair    FAMILY HISTORY: No ESRD,     Social History:    MEDICATIONS  (STANDING):  sodium chloride 0.9%. 1000 milliLiter(s) (200 mL/Hr) IV Continuous     MEDICATIONS  (PRN):    Allergies    No Known Allergies    Intolerances    REVIEW OF SYSTEMS:    CONSTITUTIONAL: No fever, weight loss,+ fatigue  EYES: No eye pain, visual disturbances, or discharge  ENMT:  No difficulty hearing, tinnitus, vertigo; No sinus or throat pain  NECK: No pain or stiffness  BREASTS: No pain, masses, or nipple discharge  RESPIRATORY: No cough, wheezing, chills or hemoptysis; No shortness of breath  CARDIOVASCULAR: No chest pain, palpitations, dizziness, or leg swelling  GASTROINTESTINAL: No abdominal or epigastric pain. No nausea, vomiting, or hematemesis; No diarrhea or constipation. No melena or hematochezia.  GENITOURINARY: No dysuria, frequency, hematuria, or incontinence  NEUROLOGICAL: No headaches, memory loss, loss of strength, numbness, or tremors  SKIN: No itching, burning, rashes, or lesions   LYMPH NODES: No enlarged glands  ENDOCRINE: No heat or cold intolerance; No hair loss  MUSCULOSKELETAL: No joint pain or swelling; No muscle, back, or extremity pain  PSYCHIATRIC: +++++ depression, anxiety, mood swings,  difficulty sleeping  HEME/LYMPH: No easy bruising, or bleeding gums  ALLERGY AND IMMUNOLOGIC: No hives or eczema      Vital Signs Last 24 Hrs  T(C): 37.2 (01 May 2022 12:09), Max: 37.4 (01 May 2022 11:09)  T(F): 98.9 (01 May 2022 12:09), Max: 99.4 (01 May 2022 11:09)  HR: 90 (01 May 2022 12:09) (90 - 98)  BP: 163/104 (01 May 2022 12:09) (163/104 - 180/124)  RR: 20 (01 May 2022 12:09) (18 - 20)  SpO2: 98% (01 May 2022 12:09) (98% - 100%)    PHYSICAL EXAM:    GENERAL: NAD, Alert, well-developed  HEAD:  Atraumatic, Normocephalic  EYES: EOMI, PERRLA, conjunctiva and sclera clear  ENMT: Moist mucous membranes, Good dentition, No lesions  NECK: Supple, No JVD,   NERVOUS SYSTEM:  Alert & Oriented X3, Good concentration;   CHEST/LUNG: Clear to percussion bilaterally; No rales, rhonchi, wheezing, or rubs  HEART: Regular rate and rhythm; No murmurs, rubs, or gallops  ABDOMEN: Soft, Nontender, Nondistended; Bowel sounds present  EXTREMITIES:  2+ Peripheral Pulses, No clubbing, cyanosis, or edema  LYMPH: No lymphadenopathy noted  SKIN: No rashes or lesions    LABS:                        14.2   6.46  )-----------( 230      ( 01 May 2022 11:34 )             46.4     05-01    139  |  104  |  14.6  ----------------------------<  109<H>  3.8   |  23.0  |  1.02    Ca    11.1<H>      01 May 2022 11:34    TPro  7.9  /  Alb  4.7  /  TBili  0.4  /  DBili  x   /  AST  17  /  ALT  14  /  AlkPhos  76  05-01    RADIOLOGY & ADDITIONAL TESTS: Personally Reviewed,    Toxicology :    45 yo female with pmhx BPD on lithium presenting with lithium ingestion 1 hour prior to arrival. Suggestive of acute lithium ingestion with early acute lithium toxicity. Lithium is absorbed in blood within one hour, then fully distributes in body by 6 hours. Acute toxicity may show GI symptoms (N/V abdominal discomfort), but may also show tremors, mental status changes, and difficulty walking.    -Recommend aggressive IVF resuscitation (2L, then maintenance IVF at 2x rate)  -Recommend obtain lithium level now, and repeat level at 6 hours (once lithium fully distributed into body)  -Obtain tox screening labs  -Recommend GI decontamination with whole bowel irrigation (1-2L of GoLytely per hour until, either PO or via NGT, until rectal effluent is clear)  -Recommend at least 6 hour observation, monitoring symptoms. If patient persistently is having GI symptoms at 6 hours, or lithium level still elevated, would recommend medical admission for further trending of lithium levels q6hour and continued IVF resuscitation  -Once lithium level is within normal limits and patient is asymptomatic, can be cleared from toxicologic perspective at that time    Joshua Miguel MD  Toxicology Fellow  Acetaminophen Level, Serum (05.01.22 @ 11:34)   Acetaminophen Level, Serum: <3.0 ug/mL Salicylate Level, Serum: <0.6 mg/dL (05.01.22 @ 11:34) Lithium Level, Serum: 1.43 mmol/L (05.01.22 @ 11:34) Creatinine, Serum: 1.02 mg/dL (05.01.22 @ 11:34)     Historical Values  Creatinine, Serum: 1.02 mg/dL (05.01.22 @ 11:34)   Creatinine, Serum: 0.73 mg/dL (12.15.14 @ 03:50) Calcium, Total Serum: 11.1 mg/dL (05.01.22 @ 11:34)     Historical Values  Calcium, Total Serum: 11.1 mg/dL (05.01.22 @ 11:34)   Calcium, Total Serum: 11.3 mg/dL (12.15.14 @ 03:50)

## 2022-05-01 NOTE — H&P ADULT - HISTORY OF PRESENT ILLNESS
47 y/o female with hx of Bipolar brought in after admittedly taking 17 600mg lithium carbonate pills around 1045am. Denies any other ingestion, noted to have N/V shortly after and brought to the ED. Patient denies any prior suicidal attempts. She denies any another recent illnesses or changes in medications. Her normal dosage of lithium is 600mg daily. Initial level noted to be 1.43, and repeat was 1.62. She was given 2 liters of NS and then started on 200ml/hr and given 2 liters of Golytely after consultation with toxicology and Nephrology. She has otherwise normal vitals, and her N/V has subsided. She is currently on a 1:1 and denies any complaints. She express remorse over what she did and does not specify one incident that triggered her ingestion, states its "a lot of things."

## 2022-05-01 NOTE — ED ADULT NURSE REASSESSMENT NOTE - NS ED NURSE REASSESS COMMENT FT1
report handed off to [facility] RN at this time. pt comfortable, no distress, VSS, skin warm dry and intact. pt aware of need for admission, aware of plan of care. remains on 1:1 observation.

## 2022-05-01 NOTE — ED ADULT NURSE REASSESSMENT NOTE - NS ED NURSE REASSESS COMMENT FT1
pt remains awake and alert, tolerating PO. 1:1 at bedside, even and unlabored resps present. pt with stable vitals, showing NSR on cardiac monitor. pt offers no complaint, aware of need for repeat lab work at 5pm.  at beside and aware of plan as well. will continue to monitor closely.

## 2022-05-01 NOTE — ED ADULT TRIAGE NOTE - CHIEF COMPLAINT QUOTE
pt states she tried to kill herself by taking 17 pills of Lithium Carbonate 600 mg took at 1045 am  A&Ox3, resp wnl, states she has been dealing with a lot of things, was + Covid Monday finished quarantine yesterday

## 2022-05-01 NOTE — ED PROVIDER NOTE - PROGRESS NOTE DETAILS
Ravin PGY-3: Discussed with Tox Fellow, recommends IVF, trend Li, whole bowel irr Ravin PGY-3: Pt states she is feeling well at this time, awaiting repeat lads and Li level

## 2022-05-02 ENCOUNTER — TRANSCRIPTION ENCOUNTER (OUTPATIENT)
Age: 47
End: 2022-05-02

## 2022-05-02 LAB
ANION GAP SERPL CALC-SCNC: 8 MMOL/L — SIGNIFICANT CHANGE UP (ref 5–17)
BUN SERPL-MCNC: 11.6 MG/DL — SIGNIFICANT CHANGE UP (ref 8–20)
CALCIUM SERPL-MCNC: 9.5 MG/DL — SIGNIFICANT CHANGE UP (ref 8.6–10.2)
CHLORIDE SERPL-SCNC: 108 MMOL/L — HIGH (ref 98–107)
CO2 SERPL-SCNC: 24 MMOL/L — SIGNIFICANT CHANGE UP (ref 22–29)
CREAT SERPL-MCNC: 1.11 MG/DL — SIGNIFICANT CHANGE UP (ref 0.5–1.3)
EGFR: 61 ML/MIN/1.73M2 — SIGNIFICANT CHANGE UP
GLUCOSE SERPL-MCNC: 99 MG/DL — SIGNIFICANT CHANGE UP (ref 70–99)
HCT VFR BLD CALC: 37.2 % — SIGNIFICANT CHANGE UP (ref 34.5–45)
HGB BLD-MCNC: 10.9 G/DL — LOW (ref 11.5–15.5)
MAGNESIUM SERPL-MCNC: 1.8 MG/DL — SIGNIFICANT CHANGE UP (ref 1.8–2.6)
MCHC RBC-ENTMCNC: 27.3 PG — SIGNIFICANT CHANGE UP (ref 27–34)
MCHC RBC-ENTMCNC: 29.3 GM/DL — LOW (ref 32–36)
MCV RBC AUTO: 93 FL — SIGNIFICANT CHANGE UP (ref 80–100)
PHOSPHATE SERPL-MCNC: 2.4 MG/DL — SIGNIFICANT CHANGE UP (ref 2.4–4.7)
PLATELET # BLD AUTO: 173 K/UL — SIGNIFICANT CHANGE UP (ref 150–400)
POTASSIUM SERPL-MCNC: 4 MMOL/L — SIGNIFICANT CHANGE UP (ref 3.5–5.3)
POTASSIUM SERPL-SCNC: 4 MMOL/L — SIGNIFICANT CHANGE UP (ref 3.5–5.3)
RBC # BLD: 4 M/UL — SIGNIFICANT CHANGE UP (ref 3.8–5.2)
RBC # FLD: 19.6 % — HIGH (ref 10.3–14.5)
SARS-COV-2 RNA SPEC QL NAA+PROBE: SIGNIFICANT CHANGE UP
SODIUM SERPL-SCNC: 139 MMOL/L — SIGNIFICANT CHANGE UP (ref 135–145)
WBC # BLD: 6.43 K/UL — SIGNIFICANT CHANGE UP (ref 3.8–10.5)
WBC # FLD AUTO: 6.43 K/UL — SIGNIFICANT CHANGE UP (ref 3.8–10.5)

## 2022-05-02 PROCEDURE — 99232 SBSQ HOSP IP/OBS MODERATE 35: CPT

## 2022-05-02 PROCEDURE — 99223 1ST HOSP IP/OBS HIGH 75: CPT

## 2022-05-02 PROCEDURE — 99233 SBSQ HOSP IP/OBS HIGH 50: CPT

## 2022-05-02 PROCEDURE — 93306 TTE W/DOPPLER COMPLETE: CPT | Mod: 26

## 2022-05-02 RX ORDER — LITHIUM CARBONATE 300 MG/1
600 TABLET, EXTENDED RELEASE ORAL
Qty: 0 | Refills: 0 | DISCHARGE

## 2022-05-02 RX ADMIN — LURASIDONE HYDROCHLORIDE 20 MILLIGRAM(S): 40 TABLET ORAL at 13:41

## 2022-05-02 RX ADMIN — SODIUM CHLORIDE 200 MILLILITER(S): 9 INJECTION INTRAMUSCULAR; INTRAVENOUS; SUBCUTANEOUS at 16:33

## 2022-05-02 RX ADMIN — SERTRALINE 75 MILLIGRAM(S): 25 TABLET, FILM COATED ORAL at 16:31

## 2022-05-02 RX ADMIN — LOSARTAN POTASSIUM 50 MILLIGRAM(S): 100 TABLET, FILM COATED ORAL at 05:40

## 2022-05-02 RX ADMIN — SODIUM CHLORIDE 200 MILLILITER(S): 9 INJECTION INTRAMUSCULAR; INTRAVENOUS; SUBCUTANEOUS at 05:40

## 2022-05-02 NOTE — BH CONSULTATION LIAISON ASSESSMENT NOTE - NSBHCHARTREVIEWLAB_PSY_A_CORE FT
Lithium Level, Serum: 1.27 mmol/L (05.01.22 @ 23:24)  05-02    139  |  108<H>  |  11.6  ----------------------------<  99  4.0   |  24.0  |  1.11    Ca    9.5      02 May 2022 03:34  Phos  2.4     05-02  Mg     1.8     05-02    TPro  7.9  /  Alb  4.7  /  TBili  0.4  /  DBili  x   /  AST  17  /  ALT  14  /  AlkPhos  76  05-01                        10.9   6.43  )-----------( 173      ( 02 May 2022 03:34 )             37.2

## 2022-05-02 NOTE — BH CONSULTATION LIAISON ASSESSMENT NOTE - SUMMARY
Patient is a 46 year old female, , domiciled, employed. PPHX of Bipolar disorder. No past psychiatric hospitalizations, no legal hx/hx of violence. No hx of substance abuse. PMHX or Hypoparathyroidism, Hernia Repair, and HTN. Patient brought in by spouse to [facility], s/p ingesting 17 Lithium pills, per patient attempt to overdose. Psychiatry consulted for suicidal ideation.    Patient was seen and evaluated. Presents with depressed/constricted affect with mood congruent features. Patient with hx of Bipolar disorder, currently connected with outpatient psychiatric NP, on Latuda/Sertraline/Lithium. Patient with overdose attempt in the context of worsening depression, with increased external psychosocial stressors. Patient remains with passive suicidal ideation, denies intent or plan. No subjective or observable manic features. No overt psychosis. Per [facility] : Reference #: [id number], no controlled substances.  Patient meets criteria for inpatient psychiatric admission and would benefit from acute mood stabilization. She is agreeable to inpatient psych admission.     Plan:  Continue Sertraline 75mg orally once daily  Latuda 20mg orally once daily  Hold Lithium until blood levels return to normal limits  Klonopin 0.5 mg orally BID PRN anxiety  Safety: Continue 1:1 for safety  Social work for inpatient psych bed availability  Psychiatry to follow PRN, transfer to inpatient psych when medically cleared

## 2022-05-02 NOTE — DISCHARGE NOTE PROVIDER - HOSPITAL COURSE
45 y/o female with intentional lithium OD, lithium toxicity, Bipolar w/ suicidal ideation, Hx of HTN    Lithium OD  -s/p Bowel prep and IVF  -Toxicology/Nephrology consults noted  Lithium levels downtrended    Bipolar Disorder with SI attempt:  -Maintain on 1:1  -Cont. Stefano Louise  - consult. Inpatient Psych admission    HTN:  -DASH diet  -Cont. Losartan     Dispo Pending inpatient psych  Medically stable for discharge with psychiatry follow up   47 y/o female with intentional lithium OD, lithium toxicity, Bipolar w/ suicidal ideation, Hx of HTN    Lithium OD  -s/p Bowel prep and IVF  -Toxicology/Nephrology consults noted  Lithium levels downtrended  asymptomatic  placed on 1:1    Bipolar Disorder with SI attempt:  -Cont. Stefano Louise  - consult. Initially, patient willing to pursue voluntary inpatient psych admission. After several days awaiting for inpatient no longer volunteers for inpatient admission.   Psychiatry re-consulted for further evaluation and to determine patient safety.     HTN:  -DASH diet  -Cont. Losartan     Stable for discharge with prompt/close psychiatry follow up. 47 y/o female with intentional lithium OD, lithium toxicity, Bipolar w/ suicidal ideation, Hx of HTN    Lithium OD  -s/p Bowel prep and IVF  -Toxicology/Nephrology consults noted  Lithium levels downtrended  asymptomatic  placed on 1:1    Bipolar Disorder with SI attempt:  -Cont. Stefano Louise  - consult. Initially, patient willing to pursue voluntary inpatient psych admission. After several days awaiting for inpatient no longer volunteers for inpatient admission.   Psychiatry re-consulted for further evaluation and to determine patient safety.     HTN:  -DASH diet  -Cont. Losartan     Evalauted by psychiatry . Patient deneis SI.Regrets her prior actions. Has appt with psychiatry on Monday.   Stable for discharge with prompt/close psychiatry follow up.

## 2022-05-02 NOTE — DISCHARGE NOTE PROVIDER - NSDCCPCAREPLAN_GEN_ALL_CORE_FT
PRINCIPAL DISCHARGE DIAGNOSIS  Diagnosis: Lithium overdose  Assessment and Plan of Treatment:       SECONDARY DISCHARGE DIAGNOSES  Diagnosis: Suicidal ideation  Assessment and Plan of Treatment:

## 2022-05-02 NOTE — BH CONSULTATION LIAISON ASSESSMENT NOTE - RISK ASSESSMENT
Risk factors: depression, anxiety symptoms, impulsivity, multiple stressors, academic/occupational decline, absence of outpatient follow-up, limited insight into symptoms, poor reactivity to stressors, difficulty expressing emotions, low frustration tolerance    Protective factors: Pt denies any active suicidal ideation/intent/plan,   no hospitalizations, has responsibility to family and others, identifies reasons for living, no active substance use, no access to firearms, no hx of abuse and adequate outpatient follow up with motivation to participate in care.     Based on risk assessment evaluation, Pt DOES appear to be at imminent risk of harm to self or others at this time.

## 2022-05-02 NOTE — DISCHARGE NOTE PROVIDER - ATTENDING DISCHARGE PHYSICAL EXAMINATION:
VITALS:   T(C): 36.9 (05-02-22 @ 07:53), Max: 37.1 (05-01-22 @ 23:50)  HR: 71 (05-02-22 @ 07:53) (59 - 78)  BP: 133/79 (05-02-22 @ 07:53) (124/77 - 143/88)  RR: 18 (05-02-22 @ 07:53) (18 - 18)  SpO2: 100% (05-02-22 @ 07:53) (97% - 100%)    GENERAL: NAD, lying in bed comfortably  HEAD:  Atraumatic, Normocephalic  EYES: EOMI, PERRLA, conjunctiva and sclera clear  ENT: Moist mucous membranes  NECK: Supple, No JVD  CHEST/LUNG: Clear to auscultation bilaterally; No rales, rhonchi, wheezing, or rubs. Unlabored respirations  HEART: Regular rate and rhythm; No murmurs, rubs, or gallops  ABDOMEN: BSx4; Soft, nontender, nondistended  EXTREMITIES:  2+ Peripheral Pulses, brisk capillary refill. No clubbing, cyanosis, or edema  NERVOUS SYSTEM:  A&Ox3, no focal deficits   SKIN: No rashes or lesions  PSYCH: Normal affect, euthymic mood     GENERAL: NAD, lying in bed comfortably  HEAD:  Atraumatic, Normocephalic  EYES: EOMI, PERRLA, conjunctiva and sclera clear  ENT: Moist mucous membranes  NECK: Supple, No JVD  CHEST/LUNG: Clear to auscultation bilaterally; No rales, rhonchi, wheezing, or rubs. Unlabored respirations  HEART: Regular rate and rhythm; No murmurs, rubs, or gallops  ABDOMEN: BSx4; Soft, nontender, nondistended  EXTREMITIES:  2+ Peripheral Pulses, brisk capillary refill. No clubbing, cyanosis, or edema  NERVOUS SYSTEM:  A&Ox3, no focal deficits   SKIN: No rashes or lesions  PSYCH: Normal affect, euthymic mood

## 2022-05-02 NOTE — DISCHARGE NOTE PROVIDER - CARE PROVIDER_API CALL
Eliseo Land)  Psychiatry  [address] [place] [address]  Phone: [phone]  Fax: [phone]  Follow Up Time: 1-3 days

## 2022-05-02 NOTE — BH CONSULTATION LIAISON ASSESSMENT NOTE - CURRENT MEDICATION
MEDICATIONS  (STANDING):  losartan 50 milliGRAM(s) Oral daily  lurasidone 20 milliGRAM(s) Oral daily  sertraline 75 milliGRAM(s) Oral daily  sodium chloride 0.9%. 1000 milliLiter(s) (200 mL/Hr) IV Continuous <Continuous>    MEDICATIONS  (PRN):  acetaminophen     Tablet .. 650 milliGRAM(s) Oral every 6 hours PRN Temp greater or equal to 38C (100.4F), Mild Pain (1 - 3)  aluminum hydroxide/magnesium hydroxide/simethicone Suspension 30 milliLiter(s) Oral every 4 hours PRN Dyspepsia  melatonin 3 milliGRAM(s) Oral at bedtime PRN Insomnia  ondansetron Injectable 4 milliGRAM(s) IV Push every 8 hours PRN Nausea and/or Vomiting

## 2022-05-02 NOTE — BH CONSULTATION LIAISON ASSESSMENT NOTE - NSBHCHARTREVIEWVS_PSY_A_CORE FT
Vital Signs Last 24 Hrs  T(C): 36.9 (02 May 2022 07:53), Max: 37.2 (01 May 2022 12:09)  T(F): 98.5 (02 May 2022 07:53), Max: 98.9 (01 May 2022 12:09)  HR: 71 (02 May 2022 07:53) (59 - 90)  BP: 133/79 (02 May 2022 07:53) (124/77 - 163/104)  BP(mean): 92 (01 May 2022 23:45) (92 - 92)  RR: 18 (02 May 2022 07:53) (18 - 20)  SpO2: 100% (02 May 2022 07:53) (97% - 100%)

## 2022-05-02 NOTE — DISCHARGE NOTE PROVIDER - NSDCMRMEDTOKEN_GEN_ALL_CORE_FT
Latuda: 20 milligram(s) orally once a day  losartan 50 mg oral tablet: 1 tab(s) orally once a day  sertraline: 75 milligram(s) orally once a day

## 2022-05-02 NOTE — PATIENT PROFILE ADULT - FALL HARM RISK - UNIVERSAL INTERVENTIONS
Bed in lowest position, wheels locked, appropriate side rails in place/Call bell, personal items and telephone in reach/Instruct patient to call for assistance before getting out of bed or chair/Non-slip footwear when patient is out of bed/[place] to call system/Physically safe environment - no spills, clutter or unnecessary equipment/Purposeful Proactive Rounding/Room/bathroom lighting operational, light cord in reach

## 2022-05-02 NOTE — BH CONSULTATION LIAISON ASSESSMENT NOTE - ADDITIONAL PSYCHIATRIC MEDICATIONS
Klonopin 1mg PRN  Sertraline 75mg orally once daily  Latuda 20mg orally once daily  Lithium 600mg orally once daily

## 2022-05-02 NOTE — BH CONSULTATION LIAISON ASSESSMENT NOTE - HPI (INCLUDE ILLNESS QUALITY, SEVERITY, DURATION, TIMING, CONTEXT, MODIFYING FACTORS, ASSOCIATED SIGNS AND SYMPTOMS)
Patient is a 46 year old female, , domiciled, employed. PPHX of Bipolar disorder. No past psychiatric hospitalizations, no legal hx/hx of violence. No hx of substance abuse. PMHX or Hypoparathyroidism, Hernia Repair, and HTN. Patient brought in by spouse to [facility], s/p ingesting 17 Lithium pills, per patient attempt to overdose. Psychiatry consulted for suicidal ideation.    Patient was seen and evaluated at bedside in the emergency room,  at bedside. Patient on 1:1 observation for safety. Patient interviewed privately, she is calm and cooperative, fairly related, actively engaged. Patient reports mood as Depressed, she states that she has been feeling this way for the past several weeks. She reports low motivation to get going in the morning and avoidance with tasks. She reports increased external stressors in the context of her home life and work. She is currently employed full time as a , she has been out of work recently due to ely COVID. She reports she began having preoccupations with suicide about 3 weeks ago, she found herself thinking about wanting to "end it all, multiple times daily." She reports, " I juvenal new the pills were there, Chary had them for a while, it was an old prescription." She states that she had mentioned only 2 days ago to her  she was having such thoughts, and she did not notify her outpatient NP psychiatric provider. She goes on to say she went into the bathroom and poured pills into her hand, she was taking two to three at a time, and stopped because she thought if she took anymore she would "throw them back up." She states that she then went into her bedroom and told her  about what she had done, he made her go to the bathroom and attempt to throw them up. She admits that her intent was to "die immediately after taking the pills, but then I started to just feel sick, so it didn't happen like I thought." She smiles and laughs inappropriately throughout interview. She expresses some remorse for her attempt, and states, " I don't think I would ever do something like that again." She denies any prior suicide attempts in the past. She notes that her depression "waxes and wanes" however she has always been able to get it under control with her psychiatric NP, and medications. She recently started taking Latuda 20mg daily, she has been on Lithium for "my whole life", and Sertraline 75mg orally once daily. She denies suicidal ideation/ /homicidal ideation, intent or plan. No acute subjective manic features reported. No AVH or perceptual disturbances, no delusions or paranoia elicited.     Spoke with her  Lyndon who reports was tearful, reports that he should have taken her  conversation with im two days prior more seriously, he notes that she has been really "low lately", noted to be frequently tearful, increasingly "clingy" with low energy and decreased motivation over the past several weeks. He collaborates the above to be true. He would like her to be inpatient for stabilization of mood symptoms.

## 2022-05-02 NOTE — BH CONSULTATION LIAISON ASSESSMENT NOTE - DESCRIPTION
Patient is employed as a , resides in private home in [place], lives with 3 children and spouse.

## 2022-05-03 PROCEDURE — 99232 SBSQ HOSP IP/OBS MODERATE 35: CPT

## 2022-05-03 RX ADMIN — SERTRALINE 75 MILLIGRAM(S): 25 TABLET, FILM COATED ORAL at 12:54

## 2022-05-03 RX ADMIN — LURASIDONE HYDROCHLORIDE 20 MILLIGRAM(S): 40 TABLET ORAL at 12:53

## 2022-05-03 RX ADMIN — LOSARTAN POTASSIUM 50 MILLIGRAM(S): 100 TABLET, FILM COATED ORAL at 05:44

## 2022-05-04 ENCOUNTER — TRANSCRIPTION ENCOUNTER (OUTPATIENT)
Age: 47
End: 2022-05-04

## 2022-05-04 VITALS
DIASTOLIC BLOOD PRESSURE: 91 MMHG | TEMPERATURE: 98 F | OXYGEN SATURATION: 100 % | HEART RATE: 62 BPM | SYSTOLIC BLOOD PRESSURE: 125 MMHG | RESPIRATION RATE: 18 BRPM

## 2022-05-04 LAB
AMPHET UR-MCNC: NEGATIVE — SIGNIFICANT CHANGE UP
BARBITURATES UR SCN-MCNC: NEGATIVE — SIGNIFICANT CHANGE UP
BENZODIAZ UR-MCNC: NEGATIVE — SIGNIFICANT CHANGE UP
COCAINE METAB.OTHER UR-MCNC: NEGATIVE — SIGNIFICANT CHANGE UP
METHADONE UR-MCNC: NEGATIVE — SIGNIFICANT CHANGE UP
MYOGLOBIN UR-MCNC: <2 NG/ML — SIGNIFICANT CHANGE UP (ref 0–13)
OPIATES UR-MCNC: NEGATIVE — SIGNIFICANT CHANGE UP
PCP SPEC-MCNC: SIGNIFICANT CHANGE UP
PCP UR-MCNC: NEGATIVE — SIGNIFICANT CHANGE UP
THC UR QL: NEGATIVE — SIGNIFICANT CHANGE UP

## 2022-05-04 PROCEDURE — 84443 ASSAY THYROID STIM HORMONE: CPT

## 2022-05-04 PROCEDURE — 82436 ASSAY OF URINE CHLORIDE: CPT

## 2022-05-04 PROCEDURE — 96360 HYDRATION IV INFUSION INIT: CPT

## 2022-05-04 PROCEDURE — 83874 ASSAY OF MYOGLOBIN: CPT

## 2022-05-04 PROCEDURE — 84156 ASSAY OF PROTEIN URINE: CPT

## 2022-05-04 PROCEDURE — 80307 DRUG TEST PRSMV CHEM ANLYZR: CPT

## 2022-05-04 PROCEDURE — 85027 COMPLETE CBC AUTOMATED: CPT

## 2022-05-04 PROCEDURE — 81001 URINALYSIS AUTO W/SCOPE: CPT

## 2022-05-04 PROCEDURE — 99233 SBSQ HOSP IP/OBS HIGH 50: CPT

## 2022-05-04 PROCEDURE — 93308 TTE F-UP OR LMTD: CPT

## 2022-05-04 PROCEDURE — 80053 COMPREHEN METABOLIC PANEL: CPT

## 2022-05-04 PROCEDURE — 83935 ASSAY OF URINE OSMOLALITY: CPT

## 2022-05-04 PROCEDURE — 84300 ASSAY OF URINE SODIUM: CPT

## 2022-05-04 PROCEDURE — U0005: CPT

## 2022-05-04 PROCEDURE — 99285 EMERGENCY DEPT VISIT HI MDM: CPT | Mod: 25

## 2022-05-04 PROCEDURE — 80178 ASSAY OF LITHIUM: CPT

## 2022-05-04 PROCEDURE — 84100 ASSAY OF PHOSPHORUS: CPT

## 2022-05-04 PROCEDURE — 84133 ASSAY OF URINE POTASSIUM: CPT

## 2022-05-04 PROCEDURE — 99239 HOSP IP/OBS DSCHRG MGMT >30: CPT

## 2022-05-04 PROCEDURE — 93005 ELECTROCARDIOGRAM TRACING: CPT

## 2022-05-04 PROCEDURE — 80048 BASIC METABOLIC PNL TOTAL CA: CPT

## 2022-05-04 PROCEDURE — U0003: CPT

## 2022-05-04 PROCEDURE — 36415 COLL VENOUS BLD VENIPUNCTURE: CPT

## 2022-05-04 PROCEDURE — 83735 ASSAY OF MAGNESIUM: CPT

## 2022-05-04 PROCEDURE — 82570 ASSAY OF URINE CREATININE: CPT

## 2022-05-04 RX ADMIN — SODIUM CHLORIDE 200 MILLILITER(S): 9 INJECTION INTRAMUSCULAR; INTRAVENOUS; SUBCUTANEOUS at 09:25

## 2022-05-04 RX ADMIN — LOSARTAN POTASSIUM 50 MILLIGRAM(S): 100 TABLET, FILM COATED ORAL at 05:42

## 2022-05-04 RX ADMIN — LURASIDONE HYDROCHLORIDE 20 MILLIGRAM(S): 40 TABLET ORAL at 09:14

## 2022-05-04 RX ADMIN — SERTRALINE 75 MILLIGRAM(S): 25 TABLET, FILM COATED ORAL at 09:25

## 2022-05-04 NOTE — PROGRESS NOTE ADULT - SUBJECTIVE AND OBJECTIVE BOX
[facility] Division of Hospital Medicine    Chief Complaint:    Overdose    SUBJECTIVE / OVERNIGHT EVENTS:  Lithium downtrending overnight   Good mentation. No abd pain. No symptoms at this time.    Patient denies chest pain, SOB, abd pain, N/V, fever, chills, dysuria or any other complaints. All remainder ROS negative.     MEDICATIONS  (STANDING):  losartan 50 milliGRAM(s) Oral daily  lurasidone 20 milliGRAM(s) Oral daily  sertraline 75 milliGRAM(s) Oral daily  sodium chloride 0.9%. 1000 milliLiter(s) (200 mL/Hr) IV Continuous <Continuous>    MEDICATIONS  (PRN):  acetaminophen     Tablet .. 650 milliGRAM(s) Oral every 6 hours PRN Temp greater or equal to 38C (100.4F), Mild Pain (1 - 3)  aluminum hydroxide/magnesium hydroxide/simethicone Suspension 30 milliLiter(s) Oral every 4 hours PRN Dyspepsia  melatonin 3 milliGRAM(s) Oral at bedtime PRN Insomnia  ondansetron Injectable 4 milliGRAM(s) IV Push every 8 hours PRN Nausea and/or Vomiting        I&O's Summary      PHYSICAL EXAM:  Vital Signs Last 24 Hrs  T(C): 36.9 (02 May 2022 07:53), Max: 37.1 (01 May 2022 23:50)  T(F): 98.5 (02 May 2022 07:53), Max: 98.7 (01 May 2022 23:50)  HR: 71 (02 May 2022 07:53) (59 - 78)  BP: 133/79 (02 May 2022 07:53) (124/77 - 143/88)  BP(mean): 92 (01 May 2022 23:45) (92 - 92)  RR: 18 (02 May 2022 07:53) (18 - 18)  SpO2: 100% (02 May 2022 07:53) (97% - 100%)        CONSTITUTIONAL: NAD, well-developed  ENMT: Moist oral mucosa, no pharyngeal injection or exudates; normal dentition  RESPIRATORY: Normal respiratory effort; lungs are clear to auscultation bilaterally  CARDIOVASCULAR: Regular rate and rhythm, normal S1 and S2, no murmur/rub/gallop; Peripheral pulses are 2+ bilaterally  ABDOMEN: Nontender to palpation, normoactive bowel sounds, no rebound/guarding;   MUSCLOSKELETAL:  No clubbing or cyanosis of digits; no joint swelling or tenderness to palpation  PSYCH: A+O to person, place, and time; affect appropriate  NEUROLOGY: CN 2-12 are intact and symmetric; no gross sensory deficits;   SKIN: No rashes; no palpable lesions    LABS:                        10.9   6.43  )-----------( 173      ( 02 May 2022 03:34 )             37.2     05-02    139  |  108<H>  |  11.6  ----------------------------<  99  4.0   |  24.0  |  1.11    Ca    9.5      02 May 2022 03:34  Phos  2.4     05-02  Mg     1.8     05-02    TPro  7.9  /  Alb  4.7  /  TBili  0.4  /  DBili  x   /  AST  17  /  ALT  14  /  AlkPhos  76  05-01          Urinalysis Basic - ( 01 May 2022 13:09 )    Color: Yellow / Appearance: Clear / S.010 / pH: x  Gluc: x / Ketone: Negative  / Bili: Negative / Urobili: Negative mg/dL   Blood: x / Protein: Negative / Nitrite: Negative   Leuk Esterase: Trace / RBC: Negative /HPF / WBC 0-2 /HPF   Sq Epi: x / Non Sq Epi: Occasional / Bacteria: Occasional        CAPILLARY BLOOD GLUCOSE            RADIOLOGY & ADDITIONAL TESTS:  Results Reviewed:   Imaging Personally Reviewed:  Electrocardiogram Personally Reviewed:                                          
[facility] DIVISION OF KIDNEY DISEASES AND HYPERTENSION -- FOLLOW UP NOTE  --------------------------------------------------------------------------------  Chief Complaint:  Li overdose    24 hour events/subjective:  Abnormal affect  Being dc'ed to voluntary psych inpatient;      PAST HISTORY  --------------------------------------------------------------------------------  No significant changes to PMH, PSH, FHx, SHx, unless otherwise noted    ALLERGIES & MEDICATIONS  --------------------------------------------------------------------------------  Allergies    No Known Allergies    Intolerances      Standing Inpatient Medications  losartan 50 milliGRAM(s) Oral daily  lurasidone 20 milliGRAM(s) Oral daily  sertraline 75 milliGRAM(s) Oral daily  sodium chloride 0.9%. 1000 milliLiter(s) IV Continuous <Continuous>    PRN Inpatient Medications  acetaminophen     Tablet .. 650 milliGRAM(s) Oral every 6 hours PRN  aluminum hydroxide/magnesium hydroxide/simethicone Suspension 30 milliLiter(s) Oral every 4 hours PRN  melatonin 3 milliGRAM(s) Oral at bedtime PRN  ondansetron Injectable 4 milliGRAM(s) IV Push every 8 hours PRN      REVIEW OF SYSTEMS  --------------------------------------------------------------------------------  Unable to obtain  Abnormal affect; laughing;    VITALS/PHYSICAL EXAM  --------------------------------------------------------------------------------  T(C): 36.9 (05-02-22 @ 07:53), Max: 37.1 (05-01-22 @ 23:50)  HR: 71 (05-02-22 @ 07:53) (59 - 78)  BP: 133/79 (05-02-22 @ 07:53) (124/77 - 143/88)  RR: 18 (05-02-22 @ 07:53) (18 - 18)  SpO2: 100% (05-02-22 @ 07:53) (97% - 100%)  Wt(kg): --  Height (cm): 165.1 (05-01-22 @ 11:09)  Weight (kg): 82.4 (05-01-22 @ 11:09)  BMI (kg/m2): 30.2 (05-01-22 @ 11:09)  BSA (m2): 1.9 (05-01-22 @ 11:09)      Physical Exam:  GENERAL: NAD, Alert, well-developed  HEAD:  Atraumatic, Normocephalic  EYES: EOMI, PERRLA, conjunctiva and sclera clear  ENMT: Moist mucous membranes, Good dentition, No lesions  NECK: Supple, No JVD,   NERVOUS SYSTEM:  Alert & Oriented X3, Good concentration;   CHEST/LUNG: Clear to percussion bilaterally; No rales, rhonchi, wheezing, or rubs  HEART: Regular rate and rhythm; No murmurs, rubs, or gallops  ABDOMEN: Soft, Nontender, Nondistended; Bowel sounds present  EXTREMITIES:  2+ Peripheral Pulses, No clubbing, cyanosis, or edema  LYMPH: No lymphadenopathy noted  SKIN: No rashes or lesions    LABS/STUDIES  --------------------------------------------------------------------------------              10.9   6.43  >-----------<  173      [05-02-22 @ 03:34]              37.2     139  |  108  |  11.6  ----------------------------<  99      [05-02-22 @ 03:34]  4.0   |  24.0  |  1.11        Ca     9.5     [05-02-22 @ 03:34]      Mg     1.8     [05-02-22 @ 03:34]      Phos  2.4     [05-02-22 @ 03:34]    TPro  7.9  /  Alb  4.7  /  TBili  0.4  /  DBili  x   /  AST  17  /  ALT  14  /  AlkPhos  76  [05-01-22 @ 11:34]          Creatinine Trend:  SCr 1.11 [05-02 @ 03:34]  SCr 0.90 [05-01 @ 17:17]  SCr 1.02 [05-01 @ 11:34]    Urinalysis - [05-01-22 @ 13:09]      Color Yellow / Appearance Clear / SG 1.010 / pH 6.5      Gluc Negative / Ketone Negative  / Bili Negative / Urobili Negative       Blood Negative / Protein Negative / Leuk Est Trace / Nitrite Negative      RBC Negative / WBC 0-2 / Hyaline  / Gran  / Sq Epi  / Non Sq Epi Occasional / Bacteria Occasional    Urine Creatinine 70      [05-01-22 @ 13:08]  Urine Protein 9.0      [05-01-22 @ 13:08]  Urine Sodium <30      [05-01-22 @ 13:08]  Urine Potassium 16      [05-01-22 @ 13:08]  Urine Chloride 28      [05-01-22 @ 13:08]  Urine Osmolality 269      [05-01-22 @ 13:09]    TSH 1.28      [05-01-22 @ 11:34]        
[facility] Division of Hospital Medicine    Chief Complaint:    Lithium overdose    SUBJECTIVE / OVERNIGHT EVENTS:  No events    Patient denies chest pain, SOB, abd pain, N/V, fever, chills, dysuria or any other complaints. All remainder ROS negative.     MEDICATIONS  (STANDING):  losartan 50 milliGRAM(s) Oral daily  lurasidone 20 milliGRAM(s) Oral daily  sertraline 75 milliGRAM(s) Oral daily  sodium chloride 0.9%. 1000 milliLiter(s) (200 mL/Hr) IV Continuous <Continuous>    MEDICATIONS  (PRN):  acetaminophen     Tablet .. 650 milliGRAM(s) Oral every 6 hours PRN Temp greater or equal to 38C (100.4F), Mild Pain (1 - 3)  aluminum hydroxide/magnesium hydroxide/simethicone Suspension 30 milliLiter(s) Oral every 4 hours PRN Dyspepsia  melatonin 3 milliGRAM(s) Oral at bedtime PRN Insomnia  ondansetron Injectable 4 milliGRAM(s) IV Push every 8 hours PRN Nausea and/or Vomiting        I&O's Summary    03 May 2022 07:01  -  04 May 2022 07:00  --------------------------------------------------------  IN: 2400 mL / OUT: 0 mL / NET: 2400 mL        PHYSICAL EXAM:  Vital Signs Last 24 Hrs  T(C): 36.8 (04 May 2022 10:53), Max: 37.1 (04 May 2022 07:54)  T(F): 98.2 (04 May 2022 10:53), Max: 98.8 (04 May 2022 07:54)  HR: 77 (04 May 2022 10:53) (69 - 94)  BP: 137/92 (04 May 2022 10:53) (133/77 - 146/97)  BP(mean): --  RR: 18 (04 May 2022 10:53) (18 - 18)  SpO2: 98% (04 May 2022 10:53) (97% - 99%)        CONSTITUTIONAL: NAD, well-developed  ENMT: Moist oral mucosa, no pharyngeal injection or exudates; normal dentition  RESPIRATORY: Normal respiratory effort; lungs are clear to auscultation bilaterally  CARDIOVASCULAR: Regular rate and rhythm, normal S1 and S2, no murmur/rub/gallop; Peripheral pulses are 2+ bilaterally  ABDOMEN: Nontender to palpation, normoactive bowel sounds, no rebound/guarding;   MUSCLOSKELETAL:  No clubbing or cyanosis of digits; no joint swelling or tenderness to palpation  PSYCH: A+O to person, place, and time; affect appropriate  NEUROLOGY: CN 2-12 are intact and symmetric; no gross sensory deficits;   SKIN: No rashes; no palpable lesions    LABS:                    CAPILLARY BLOOD GLUCOSE            RADIOLOGY & ADDITIONAL TESTS:  Results Reviewed:   Imaging Personally Reviewed:  Electrocardiogram Personally Reviewed:                                          
[facility] Division of Hospital Medicine    Chief Complaint:    Lithium overdose    SUBJECTIVE / OVERNIGHT EVENTS:  No overnight events  patient feels well. asymptomatic    Patient denies chest pain, SOB, abd pain, N/V, fever, chills, dysuria or any other complaints. All remainder ROS negative.     MEDICATIONS  (STANDING):  losartan 50 milliGRAM(s) Oral daily  lurasidone 20 milliGRAM(s) Oral daily  sertraline 75 milliGRAM(s) Oral daily  sodium chloride 0.9%. 1000 milliLiter(s) (200 mL/Hr) IV Continuous <Continuous>    MEDICATIONS  (PRN):  acetaminophen     Tablet .. 650 milliGRAM(s) Oral every 6 hours PRN Temp greater or equal to 38C (100.4F), Mild Pain (1 - 3)  aluminum hydroxide/magnesium hydroxide/simethicone Suspension 30 milliLiter(s) Oral every 4 hours PRN Dyspepsia  melatonin 3 milliGRAM(s) Oral at bedtime PRN Insomnia  ondansetron Injectable 4 milliGRAM(s) IV Push every 8 hours PRN Nausea and/or Vomiting        I&O's Summary      PHYSICAL EXAM:  Vital Signs Last 24 Hrs  T(C): 37.1 (03 May 2022 11:14), Max: 37.2 (02 May 2022 19:16)  T(F): 98.7 (03 May 2022 11:14), Max: 99 (02 May 2022 19:16)  HR: 80 (03 May 2022 11:14) (54 - 80)  BP: 128/88 (03 May 2022 11:14) (124/83 - 145/89)  BP(mean): --  RR: 18 (03 May 2022 11:14) (18 - 18)  SpO2: 99% (03 May 2022 11:14) (98% - 99%)        CONSTITUTIONAL: NAD, well-developed  ENMT: Moist oral mucosa, no pharyngeal injection or exudates; normal dentition  RESPIRATORY: Normal respiratory effort; lungs are clear to auscultation bilaterally  CARDIOVASCULAR: Regular rate and rhythm, normal S1 and S2, no murmur/rub/gallop; Peripheral pulses are 2+ bilaterally  ABDOMEN: Nontender to palpation, normoactive bowel sounds, no rebound/guarding;   MUSCLOSKELETAL:  No clubbing or cyanosis of digits; no joint swelling or tenderness to palpation  PSYCH: A+O to person, place, and time; affect appropriate  NEUROLOGY: CN 2-12 are intact and symmetric; no gross sensory deficits;   SKIN: No rashes; no palpable lesions    LABS:                        10.9   6.43  )-----------( 173      ( 02 May 2022 03:34 )             37.2     05-02    139  |  108<H>  |  11.6  ----------------------------<  99  4.0   |  24.0  |  1.11    Ca    9.5      02 May 2022 03:34  Phos  2.4     05-02  Mg     1.8     -    TPro  7.9  /  Alb  4.7  /  TBili  0.4  /  DBili  x   /  AST  17  /  ALT  14  /  AlkPhos  76  05-01          Urinalysis Basic - ( 01 May 2022 13:09 )    Color: Yellow / Appearance: Clear / S.010 / pH: x  Gluc: x / Ketone: Negative  / Bili: Negative / Urobili: Negative mg/dL   Blood: x / Protein: Negative / Nitrite: Negative   Leuk Esterase: Trace / RBC: Negative /HPF / WBC 0-2 /HPF   Sq Epi: x / Non Sq Epi: Occasional / Bacteria: Occasional        CAPILLARY BLOOD GLUCOSE            RADIOLOGY & ADDITIONAL TESTS:  Results Reviewed:   Imaging Personally Reviewed:  Electrocardiogram Personally Reviewed:

## 2022-05-04 NOTE — BH CONSULTATION LIAISON PROGRESS NOTE - NSBHFUPINTERVALHXFT_PSY_A_CORE
Patient is a 46 year old female, , domiciled, employed. PPHX of Bipolar disorder. No past psychiatric hospitalizations, no legal hx/hx of violence. No hx of substance abuse. PMHX or Hypoparathyroidism, Hernia Repair, and HTN. Patient BIB by spouse to [facility] ED s/p ingesting 17 Lithium pills, per patient attempt to overdose. Psychiatry consulted for suicidal ideation.    Patient seen and evaluated at bedside, accompanied by . Patient on 1:1 observation for safety. Patient interviewed privately, she is calm and cooperative, actively engaged. Patient states she "feels better and wants to go home". Patient initially placed on voluntary admission after initial evaluation, but requesting to be discharged home. Patient feels that "going inpatient" will not benefit her at this time as she has been in the hospital now for 3 days and want to "get a jump start on getting better" through outpatient therapy. Patient spoke with her outpatient provider, Wandy Carrillo NP, and states she is able to follow up with her outpatient, but has yet to make an appointment. Patient states she feels safe to go home. No acute subjective manic features reported. Patient denies SI/HI/AVH.    Spoke with  at bedside: States he feels that the patient is safe to go home. States that there are no firearms in the home. States that he is going to be dispensing her medication that he has locked up. Admits that he is taking family leave to assist in the care of his wife and have someone home with her to help her transition back to home. Admits that "going back home" would be of best interest for the patient.  Patient is a 46 year old female, , domiciled, employed. PPHX of Bipolar disorder. No past psychiatric hospitalizations, no legal hx/hx of violence. No hx of substance abuse. PMHX or Hypoparathyroidism, Hernia Repair, and HTN. Patient BIB by spouse to [facility] ED s/p ingesting 17 Lithium pills, per patient attempt to overdose. Psychiatry consulted for suicidal ideation.    Patient seen and evaluated at bedside, accompanied by . Patient on 1:1 observation for safety. Patient interviewed privately, she is calm and cooperative, actively engaged. Patient states she "feels better and wants to go home". Patient initially placed on voluntary admission after initial evaluation, but requesting to be discharged home. Patient feels that "going inpatient" will not benefit her at this time as she has been in the hospital now for 3 days and want to "get a jump start on getting better" through outpatient therapy. Patient spoke with her outpatient provider, Wandy Carrillo NP, and states she is able to follow up with her outpatient, but has yet to make an appointment. Patient states she feels safe to go home and would return to ED if symptoms returned or worsen. No acute subjective manic features reported. Patient denies SI/HI/AVH.    Spoke with  at bedside: States he feels that the patient is safe to go home. States that there are no firearms in the home. States that he is going to be dispensing her medication that he has locked up. Admits that he is taking family leave to assist in the care of his wife and have someone home with her to help her transition back to home. Admits that "going back home" would be of best interest for the patient.  Patient is a 46 year old female, , domiciled, employed. PPHX of Bipolar disorder. No past psychiatric hospitalizations, no legal hx/hx of violence. No hx of substance abuse. PMHX or Hypoparathyroidism, Hernia Repair, and HTN. Patient BIB by spouse to [facility] ED s/p ingesting 17 Lithium pills, per patient attempt to overdose. Psychiatry consulted for suicidal ideation.    Patient seen and evaluated at bedside, accompanied by . Patient on 1:1 observation for safety. As per nursing notes, patient with no acute behavioral disturbances or somatic complaints. Patient interviewed privately, A&Ox4, is calm and cooperative, actively engaged. Patient states she "feels better and wants to go home". Patient initially placed on voluntary admission after initial evaluation, but requesting to be discharged home. Patient feels that "going inpatient" will not benefit her at this time as she has been in the hospital now for 3 days and want to "get a jump start on getting better" through outpatient therapy. Patient spoke with her outpatient provider, Wandy Carrillo NP, and states she is able to follow up with her outpatient, but has yet to make an appointment. Patient states she feels safe to go home and would return to ED if symptoms returned or worsen. No acute subjective manic features reported. Patient denies SI/HI/AVH.    Spoke with  at bedside: States he feels that the patient is safe to go home. States that there are no firearms in the home. States that he is going to be dispensing her medication that he has locked up. Admits that he is taking family leave to assist in the care of his wife and have someone home with her to help her transition back to home. Admits that "going back home" would be of best interest for the patient.  Patient is a 46 year old female, , domiciled, employed. PPHX of Bipolar disorder. No past psychiatric hospitalizations, no legal hx/hx of violence. No hx of substance abuse. PMHX or Hypoparathyroidism, Hernia Repair, and HTN. Patient BIB by spouse to [facility] ED s/p ingesting 17 Lithium pills, per patient attempt to overdose. Psychiatry consulted for suicidal ideation.    Patient seen and evaluated at bedside, accompanied by . Patient on 1:1 observation for safety. As per nursing notes, patient with no acute behavioral disturbances or somatic complaints. Patient interviewed privately, A&Ox4, is calm and cooperative, actively engaged. Patient states she "feels better and wants to go home". Patient initially placed on voluntary admission after initial evaluation, but requesting to be discharged home. Patient feels that "going inpatient" will not benefit her at this time as she has been in the hospital now for 3 days and want to "get a jump start on getting better" through outpatient therapy. Patient spoke with her outpatient provider, Wandy Carrillo NP, and states she is able to follow up with her outpatient, but has yet to make an appointment. Patient states she feels safe to go home and would return to ED if symptoms returned or worsen. No acute subjective manic features reported. Patient denies SI/HI/AVH.    Spoke with  at bedside: States he feels that the patient is safe to go home. States that there are no firearms in the home. States that he is going to be dispensing her medication that he has locked up. Admits that he is taking medical family leave to assist in 24/7 care of his wife and have someone home with her to help her transition back to home. Admits that "going back home" would be of best interest for the patient.

## 2022-05-04 NOTE — PROGRESS NOTE ADULT - ASSESSMENT
47 y/o female with intentional lithium OD, lithium toxicity, Bipolar w/ suicidal ideation, Hx of HTN    Lithium OD  -s/p Bowel prep and IVF  Lithium levels downtrended  -Toxicology/Nephrology consults noted  -OOB, ambulate, DVT-P w/ VC boots     Bipolar Disorder with SI attempt:  -Maintain on 1:1  -Cont. Stefano Louise  - consult. Inpatient Psych admission    HTN:  -DASH diet  -Cont. Losartan     Dispo Pending inpatient psych  Medically stable for discharge  
1) Lithium overdose  2) Suicide attempt  3) HTN  4) Hypercalcemia    Li levels normalized  SCr normal  Signing off  Pt being discharged to psych inpatient  TY
47 y/o female with intentional lithium OD, lithium toxicity, Bipolar w/ suicidal ideation, Hx of HTN    Lithium OD  -s/p Bowel prep and IVF  Lithium levels downtrended  -Toxicology/Nephrology consults noted  -OOB, ambulate, DVT-P w/ VC boots     Bipolar Disorder with SI attempt:  -Maintain on 1:1  -Cont. Stefano Louise  - consult. Inpatient Psych admission    HTN:  -DASH diet  -Cont. Losartan     Dispo Patient no longer volunteering for inpt psych admission.   Awaiting further psychiatry recs to determine safety for dc home.   Medically stable   
47 y/o female with intentional lithium OD, lithium toxicity, Bipolar w/ suicidal ideation, Hx of HTN    Lithium OD  -s/p Bowel prep and IVF  Lithium levels downtrended  -Toxicology/Nephrology consults noted  -OOB, ambulate, DVT-P w/ VC boots     Bipolar Disorder with SI attempt:  -Maintain on 1:1  -Cont. Stefano Louise  - consult. Inpatient Psych admission    HTN:  -DASH diet  -Cont. Losartan     Dispo Pending inpatient psych  Medically stable for discharge

## 2022-05-04 NOTE — DISCHARGE NOTE NURSING/CASE MANAGEMENT/SOCIAL WORK - PATIENT PORTAL LINK FT
You can access the FollowMyHealth Patient Portal offered by [facility] by registering at the following website: [email or web address]. By joining [facility]’s FollowMyHealth portal, you will also be able to view your health information using other applications (apps) compatible with our system.

## 2022-05-04 NOTE — BH CONSULTATION LIAISON PROGRESS NOTE - NSBHMSEIMPULSE_PSY_A_CORE
Impaired Double Island Pedicle Flap Text: The defect edges were debeveled with a #15 scalpel blade.  Given the location of the defect, shape of the defect and the proximity to free margins a double island pedicle advancement flap was deemed most appropriate.  Using a sterile surgical marker, an appropriate advancement flap was drawn incorporating the defect, outlining the appropriate donor tissue and placing the expected incisions within the relaxed skin tension lines where possible.    The area thus outlined was incised deep to adipose tissue with a #15 scalpel blade.  The skin margins were undermined to an appropriate distance in all directions around the primary defect and laterally outward around the island pedicle utilizing iris scissors.  There was minimal undermining beneath the pedicle flap.

## 2022-05-04 NOTE — BH CONSULTATION LIAISON PROGRESS NOTE - NSBHASSESSMENTFT_PSY_ALL_CORE
Patient is a 46 year old female, , domiciled, employed. PPHX of Bipolar disorder. No past psychiatric hospitalizations, no legal hx/hx of violence. No hx of substance abuse. PMHX or Hypoparathyroidism, Hernia Repair, and HTN. Patient BIB spouse to [facility] ED s/p ingesting 17 Lithium pills, per patient attempt to overdose. Psychiatry consulted for suicidal ideation.    Patient was seen and evaluated at bedside. Patient with hx of Bipolar disorder, currently connected with outpatient psychiatric NP, on Latuda/Sertraline/Lithium. Presents with full affect with mood congruent features. Unremarkable thought content, no evidence of internal preoccupations. No subjective or observable manic features, no overt psychosis. Patient denies suicidal ideation, intent, or plan. Patient expresses safe discharge to home and amendable to follow up with outpatient therapy. Patient NOT TO BE discharged until outpatient appointment is confirmed with Wandy Carrillo NP ().     PLAN:   Hold for reassessment: Patient must have outpatient follow up scheduled and confirmed for safe discharge.  Continue medications as prescribed   Safety: continue 1:1 for safety  Provided patient with DASH and Suicide hotline information  Psych to follow until clear for d/c  Patient is a 46 year old female, , domiciled, employed. PPHX of Bipolar disorder. No past psychiatric hospitalizations, no legal hx/hx of violence. No hx of substance abuse. PMHX or Hypoparathyroidism, Hernia Repair, and HTN. Patient BIB spouse to [facility] ED s/p ingesting 17 Lithium pills, per patient attempt to overdose. Psychiatry consulted for suicidal ideation.    Patient was seen and evaluated at bedside. Patient with hx of Bipolar disorder, currently connected with outpatient psychiatric NP, on Latuda/Sertraline/Lithium. Presents with full affect with mood congruent features. Unremarkable thought content, no evidence of internal preoccupations. No subjective or observable manic features, no overt psychosis. Patient denies suicidal ideation, intent, or plan. Patient is requesting to be discharged home and follow with outpatient provider. Patient does not meet criteria for inpatient hospitalization. Patient was instructed to return to emergency room if symptoms worsen or persist. Patient given DASH information and 24/7 suicide hotline. Patient to be held until outpatient appointment is confirmed with Wandy Carrillo NP ().       PLAN:   Hold for reassessment: Patient must have outpatient follow up scheduled and confirmed for safe discharge.  Continue medications as prescribed   Safety: continue 1:1 for safety  Provided patient with DASH and Suicide hotline information  Psych to follow until clear for d/c  Patient is a 46 year old female, , domiciled, employed. PPHX of Bipolar disorder. No past psychiatric hospitalizations, no legal hx/hx of violence. No hx of substance abuse. PMHX or Hypoparathyroidism, Hernia Repair, and HTN. Patient BIB spouse to [facility] ED s/p ingesting 17 Lithium pills, per patient attempt to overdose. Psychiatry consulted for suicidal ideation.    Patient was seen and evaluated at bedside. Patient with hx of Bipolar disorder, currently connected with outpatient psychiatric NP, on Latuda/Sertraline/Lithium. Presents with full affect with mood congruent features. Unremarkable thought content, no evidence of internal preoccupations. No subjective or observable manic features, no overt psychosis. Patient denies suicidal ideation, intent, or plan. Patient is requesting to be discharged home and follow with outpatient provider. Patient does not meet criteria for inpatient hospitalization. Patient was instructed to return to emergency room if symptoms worsen or persist. Patient given DASH information and 24/7 suicide hotline. Patient has follow up appointment with her outpatient provider Wandy Carrillo -[phone], on Friday 5/6 at 10am.       PLAN:   No psychiatric contraindications to discharge  Restart Lithium 600mg 1 capsule daily at home.   Continue Latuda 20mg daily & Zoloft 75mg daily.   Provided patient with DASH and Suicide hotline information Patient is a 46 year old female, , domiciled, employed. PPHX of Bipolar disorder. No past psychiatric hospitalizations, no legal hx/hx of violence. No hx of substance abuse. PMHX or Hypoparathyroidism, Hernia Repair, and HTN. Patient BIB spouse to [facility] ED s/p ingesting 17 Lithium pills, per patient attempt to overdose. Psychiatry consulted for suicidal ideation.    Patient was seen and evaluated at bedside. Patient with hx of Bipolar disorder, currently connected with outpatient psychiatric NP, on Latuda/Sertraline/Lithium. Presents with euthymic mood and inappropriate affect, giggling/joking inappropriately at times (possibly a coping mechanism for current state). Unremarkable thought content, no evidence of internal preoccupations. No subjective or observable manic features, no overt psychosis. Patient denies suicidal ideation, intent, or plan. Patient is requesting to be discharged home and follow with outpatient provider. Patient does not meet criteria for inpatient hospitalization. Patient was instructed to return to emergency room if symptoms worsen or persist. Patient given DASH information and 24/7 suicide hotline. Patient has follow up appointment with her outpatient provider Wandy Carrillo -[phone], on Friday 5/6 at 10am.       PLAN:   No psychiatric contraindications to discharge  Restart Lithium 600mg 1 capsule daily at home.   Continue Latuda 20mg daily & Zoloft 75mg daily.   Provided patient with DASH and Suicide hotline information

## 2022-05-04 NOTE — BH CONSULTATION LIAISON PROGRESS NOTE - NSBHCHARTREVIEWVS_PSY_A_CORE FT
Vital Signs Last 24 Hrs  T(C): 36.8 (04 May 2022 10:53), Max: 37.1 (04 May 2022 07:54)  T(F): 98.2 (04 May 2022 10:53), Max: 98.8 (04 May 2022 07:54)  HR: 77 (04 May 2022 10:53) (69 - 94)  BP: 137/92 (04 May 2022 10:53) (133/77 - 146/97)  BP(mean): --  RR: 18 (04 May 2022 10:53) (18 - 18)  SpO2: 98% (04 May 2022 10:53) (97% - 99%)

## 2022-05-06 ENCOUNTER — APPOINTMENT (OUTPATIENT)
Dept: FAMILY MEDICINE | Facility: CLINIC | Age: 47
End: 2022-05-06
Payer: COMMERCIAL

## 2022-05-06 DIAGNOSIS — Z76.89 PERSONS ENCOUNTERING HEALTH SERVICES IN OTHER SPECIFIED CIRCUMSTANCES: ICD-10-CM

## 2022-05-06 DIAGNOSIS — R55 SYNCOPE AND COLLAPSE: ICD-10-CM

## 2022-05-06 DIAGNOSIS — F31.9 BIPOLAR DISORDER, UNSPECIFIED: ICD-10-CM

## 2022-05-06 PROCEDURE — 99213 OFFICE O/P EST LOW 20 MIN: CPT | Mod: 95

## 2022-05-06 PROCEDURE — 99203 OFFICE O/P NEW LOW 30 MIN: CPT | Mod: 95

## 2022-05-09 ENCOUNTER — NON-APPOINTMENT (OUTPATIENT)
Age: 47
End: 2022-05-09

## 2022-05-10 PROBLEM — F31.9 BIPOLAR DISORDER WITH DEPRESSION: Status: ACTIVE | Noted: 2021-06-04

## 2022-05-10 PROBLEM — Z76.89 ENCOUNTER FOR SUPPORT AND COORDINATION OF TRANSITION OF CARE: Status: ACTIVE | Noted: 2022-05-10

## 2022-05-12 ENCOUNTER — APPOINTMENT (OUTPATIENT)
Dept: OBGYN | Facility: CLINIC | Age: 47
End: 2022-05-12
Payer: COMMERCIAL

## 2022-05-12 VITALS
DIASTOLIC BLOOD PRESSURE: 85 MMHG | HEART RATE: 69 BPM | HEIGHT: 65 IN | BODY MASS INDEX: 29.99 KG/M2 | SYSTOLIC BLOOD PRESSURE: 126 MMHG | WEIGHT: 180 LBS

## 2022-05-12 PROCEDURE — 99214 OFFICE O/P EST MOD 30 MIN: CPT

## 2022-05-12 NOTE — DISCUSSION/SUMMARY
[FreeTextEntry1] : worsening anemia sec blood loss.\par reviewed cbc, thyroid function tests, bleeding indices. \par dw pt therapies available for menorrhaggia incl nsaids, hormonal tx, ablation, hysterectomy. pt denies pelvic pain, and had htn assos w starting ocps in past. \par plan us and endo bx, cytotec prescribed, rba dw pt and . spent 30 min in encounter.

## 2022-05-12 NOTE — HISTORY OF PRESENT ILLNESS
[FreeTextEntry1] : 45 yo p3 here for consulttion advised by pcp for worsening anemia sec to menorrhaggia. \par pt had btl. reports periods are heavier than in the past, H/H progressively lower. \par pt has manic depression, had several exacerbations coincident w anemia. \par

## 2022-05-19 ENCOUNTER — RESULT CHARGE (OUTPATIENT)
Age: 47
End: 2022-05-19

## 2022-05-19 ENCOUNTER — APPOINTMENT (OUTPATIENT)
Dept: OBGYN | Facility: CLINIC | Age: 47
End: 2022-05-19
Payer: COMMERCIAL

## 2022-05-19 ENCOUNTER — APPOINTMENT (OUTPATIENT)
Dept: ANTEPARTUM | Facility: CLINIC | Age: 47
End: 2022-05-19
Payer: COMMERCIAL

## 2022-05-19 ENCOUNTER — ASOB RESULT (OUTPATIENT)
Age: 47
End: 2022-05-19

## 2022-05-19 VITALS
SYSTOLIC BLOOD PRESSURE: 124 MMHG | HEIGHT: 65 IN | WEIGHT: 180 LBS | DIASTOLIC BLOOD PRESSURE: 80 MMHG | BODY MASS INDEX: 29.99 KG/M2

## 2022-05-19 DIAGNOSIS — D50.0 IRON DEFICIENCY ANEMIA SECONDARY TO BLOOD LOSS (CHRONIC): ICD-10-CM

## 2022-05-19 PROCEDURE — 58558Z: CUSTOM

## 2022-05-19 PROCEDURE — 76830 TRANSVAGINAL US NON-OB: CPT

## 2022-05-19 PROCEDURE — 76856 US EXAM PELVIC COMPLETE: CPT | Mod: 59

## 2022-05-19 NOTE — DISCUSSION/SUMMARY
[FreeTextEntry1] : menorrhaggia w anemia. \par dw pt tx options incl ocps, iud, ablation, hysterectomy. \par pt would like to proceed w endometrial ablation with hormonal iud insertion. zuleyma dw pt.

## 2022-05-19 NOTE — HISTORY OF PRESENT ILLNESS
[FreeTextEntry1] : pt had us for anemia w menorrhaggia. here to review, have endo biopsy, and discuss tx option. \par us shows nl adnexa, 2+ cm fibroid, and 17 mm endo lining poss polyp.

## 2022-05-19 NOTE — PROCEDURE
[Hysteroscopy] : Hysteroscopy [Time out performed] : Pre-procedure time out performed.  Patient's name, date of birth and procedure confirmed. [Consent Obtained] : Consent obtained [Abnormal uterine bleeding] : abnormal uterine bleeding [Risks] : risks [Benefits] : benefits [Alternatives] : alternatives [Patient] : patient [Infection] : infection [Bleeding] : bleeding [Allergic Reaction] : allergic reaction [Pretreatment with misoprostol] : pretreatment with misoprostol [Lidocaine___ mL] : [unfilled] ~UmL of lidocaine [flexible] : Using aseptic technique a hysteroscopy was performed using a flexible hysteroscope [Sent to Pathology] : specimen was placed in buffered formalin and sent for pathology [Antibiotics given] : antibiotics not given [Hemostasis obtained] : hemostasis obtained [Tolerated Well] : Patient tolerated the procedure well [Aftercare instructions/regstrictions given and follow-up scheduled] : Aftercare instructions/restrictions given and follow-up scheduled [de-identified] : some polypoid tissue noted

## 2022-05-20 LAB
HCG UR QL: NEGATIVE
QUALITY CONTROL: YES

## 2022-05-26 ENCOUNTER — NON-APPOINTMENT (OUTPATIENT)
Age: 47
End: 2022-05-26

## 2022-06-03 ENCOUNTER — OUTPATIENT (OUTPATIENT)
Dept: OUTPATIENT SERVICES | Facility: HOSPITAL | Age: 47
LOS: 1 days | End: 2022-06-03
Payer: COMMERCIAL

## 2022-06-03 DIAGNOSIS — Z98.890 OTHER SPECIFIED POSTPROCEDURAL STATES: Chronic | ICD-10-CM

## 2022-06-03 DIAGNOSIS — Z01.818 ENCOUNTER FOR OTHER PREPROCEDURAL EXAMINATION: ICD-10-CM

## 2022-06-03 LAB
ANION GAP SERPL CALC-SCNC: 12 MMOL/L — SIGNIFICANT CHANGE UP (ref 5–17)
APTT BLD: 29.8 SEC — SIGNIFICANT CHANGE UP (ref 27.5–35.5)
BASOPHILS # BLD AUTO: 0.03 K/UL — SIGNIFICANT CHANGE UP (ref 0–0.2)
BASOPHILS NFR BLD AUTO: 0.6 % — SIGNIFICANT CHANGE UP (ref 0–2)
BUN SERPL-MCNC: 26.5 MG/DL — HIGH (ref 8–20)
CALCIUM SERPL-MCNC: 10.5 MG/DL — HIGH (ref 8.6–10.2)
CHLORIDE SERPL-SCNC: 106 MMOL/L — SIGNIFICANT CHANGE UP (ref 98–107)
CO2 SERPL-SCNC: 22 MMOL/L — SIGNIFICANT CHANGE UP (ref 22–29)
CREAT SERPL-MCNC: 1.05 MG/DL — SIGNIFICANT CHANGE UP (ref 0.5–1.3)
EGFR: 66 ML/MIN/1.73M2 — SIGNIFICANT CHANGE UP
EOSINOPHIL # BLD AUTO: 0.1 K/UL — SIGNIFICANT CHANGE UP (ref 0–0.5)
EOSINOPHIL NFR BLD AUTO: 1.9 % — SIGNIFICANT CHANGE UP (ref 0–6)
GLUCOSE SERPL-MCNC: 83 MG/DL — SIGNIFICANT CHANGE UP (ref 70–99)
HCG SERPL-ACNC: <4 MIU/ML — SIGNIFICANT CHANGE UP
HCT VFR BLD CALC: 40.6 % — SIGNIFICANT CHANGE UP (ref 34.5–45)
HGB BLD-MCNC: 12.6 G/DL — SIGNIFICANT CHANGE UP (ref 11.5–15.5)
IMM GRANULOCYTES NFR BLD AUTO: 0.2 % — SIGNIFICANT CHANGE UP (ref 0–1.5)
INR BLD: 1 RATIO — SIGNIFICANT CHANGE UP (ref 0.88–1.16)
LYMPHOCYTES # BLD AUTO: 1.27 K/UL — SIGNIFICANT CHANGE UP (ref 1–3.3)
LYMPHOCYTES # BLD AUTO: 24.6 % — SIGNIFICANT CHANGE UP (ref 13–44)
MCHC RBC-ENTMCNC: 29.7 PG — SIGNIFICANT CHANGE UP (ref 27–34)
MCHC RBC-ENTMCNC: 31 GM/DL — LOW (ref 32–36)
MCV RBC AUTO: 95.8 FL — SIGNIFICANT CHANGE UP (ref 80–100)
MONOCYTES # BLD AUTO: 0.42 K/UL — SIGNIFICANT CHANGE UP (ref 0–0.9)
MONOCYTES NFR BLD AUTO: 8.1 % — SIGNIFICANT CHANGE UP (ref 2–14)
NEUTROPHILS # BLD AUTO: 3.33 K/UL — SIGNIFICANT CHANGE UP (ref 1.8–7.4)
NEUTROPHILS NFR BLD AUTO: 64.6 % — SIGNIFICANT CHANGE UP (ref 43–77)
PLATELET # BLD AUTO: 235 K/UL — SIGNIFICANT CHANGE UP (ref 150–400)
POTASSIUM SERPL-MCNC: 4.5 MMOL/L — SIGNIFICANT CHANGE UP (ref 3.5–5.3)
POTASSIUM SERPL-SCNC: 4.5 MMOL/L — SIGNIFICANT CHANGE UP (ref 3.5–5.3)
PROTHROM AB SERPL-ACNC: 11.6 SEC — SIGNIFICANT CHANGE UP (ref 10.5–13.4)
RBC # BLD: 4.24 M/UL — SIGNIFICANT CHANGE UP (ref 3.8–5.2)
RBC # FLD: 16.2 % — HIGH (ref 10.3–14.5)
SODIUM SERPL-SCNC: 140 MMOL/L — SIGNIFICANT CHANGE UP (ref 135–145)
WBC # BLD: 5.16 K/UL — SIGNIFICANT CHANGE UP (ref 3.8–10.5)
WBC # FLD AUTO: 5.16 K/UL — SIGNIFICANT CHANGE UP (ref 3.8–10.5)

## 2022-06-03 PROCEDURE — 85025 COMPLETE CBC W/AUTO DIFF WBC: CPT

## 2022-06-03 PROCEDURE — 84702 CHORIONIC GONADOTROPIN TEST: CPT

## 2022-06-03 PROCEDURE — 93005 ELECTROCARDIOGRAM TRACING: CPT

## 2022-06-03 PROCEDURE — 36415 COLL VENOUS BLD VENIPUNCTURE: CPT

## 2022-06-03 PROCEDURE — 93010 ELECTROCARDIOGRAM REPORT: CPT

## 2022-06-03 PROCEDURE — G0463: CPT

## 2022-06-03 PROCEDURE — 85730 THROMBOPLASTIN TIME PARTIAL: CPT

## 2022-06-03 PROCEDURE — 80048 BASIC METABOLIC PNL TOTAL CA: CPT

## 2022-06-03 PROCEDURE — 85610 PROTHROMBIN TIME: CPT

## 2022-06-04 DIAGNOSIS — Z20.822 CONTACT WITH AND (SUSPECTED) EXPOSURE TO COVID-19: ICD-10-CM

## 2022-06-07 LAB — CORE LAB BIOPSY: NORMAL

## 2022-06-08 PROBLEM — R55 SYNCOPE AND COLLAPSE: Status: ACTIVE | Noted: 2022-06-08

## 2022-06-09 NOTE — HISTORY OF PRESENT ILLNESS
[Home] : at home, [unfilled] , at the time of the visit. [Medical Office: ([facility])___] : at the medical office located in  [Verbal consent obtained from patient] : the patient, [unfilled] [FreeTextEntry8] : Patient s/p hospitalization for syncope and exacerbation of bipolar disease. Patient doing well at home, supportive family present. [facility] papers needed for work. specialists clearance for 5/23/2022

## 2022-06-11 LAB — SARS-COV-2 N GENE NPH QL NAA+PROBE: NOT DETECTED

## 2022-06-15 ENCOUNTER — APPOINTMENT (OUTPATIENT)
Dept: OBGYN | Facility: AMBULATORY SURGERY CENTER | Age: 47
End: 2022-06-15
Payer: COMMERCIAL

## 2022-06-15 ENCOUNTER — RESULT REVIEW (OUTPATIENT)
Age: 47
End: 2022-06-15

## 2022-06-15 PROCEDURE — 58563 HYSTEROSCOPY ABLATION: CPT

## 2022-06-15 PROCEDURE — 58300 INSERT INTRAUTERINE DEVICE: CPT

## 2022-07-14 ENCOUNTER — APPOINTMENT (OUTPATIENT)
Dept: OBGYN | Facility: CLINIC | Age: 47
End: 2022-07-14

## 2022-07-14 VITALS
HEART RATE: 57 BPM | BODY MASS INDEX: 28.32 KG/M2 | WEIGHT: 170 LBS | SYSTOLIC BLOOD PRESSURE: 127 MMHG | DIASTOLIC BLOOD PRESSURE: 79 MMHG | HEIGHT: 65 IN

## 2022-07-14 DIAGNOSIS — N94.6 DYSMENORRHEA, UNSPECIFIED: ICD-10-CM

## 2022-07-14 DIAGNOSIS — B37.3 CANDIDIASIS OF VULVA AND VAGINA: ICD-10-CM

## 2022-07-14 DIAGNOSIS — N89.8 OTHER SPECIFIED NONINFLAMMATORY DISORDERS OF VAGINA: ICD-10-CM

## 2022-07-14 PROCEDURE — 99213 OFFICE O/P EST LOW 20 MIN: CPT

## 2022-07-14 NOTE — DISCUSSION/SUMMARY
[FreeTextEntry1] : leukorrhea post hta. culture done. \par doxycycline course prescribed. call w results.

## 2022-07-14 NOTE — HISTORY OF PRESENT ILLNESS
[FreeTextEntry1] : 47 yo pt here to fu ablation done for menorhaggia. reports ongoing clear dc requiring pad x past 4 weeks. no period yet. \par

## 2022-07-20 LAB — BACTERIA GENITAL AEROBE CULT: NORMAL

## 2022-11-15 NOTE — ED ADULT NURSE NOTE - NS ED NURSE RECORD ANOTHER VITAL SIGN
Medicare Preventive Visit Patient Instructions  Thank you for completing your Welcome to Medicare Visit or Medicare Annual Wellness Visit today  Your next wellness visit will be due in one year (11/16/2023)  The screening/preventive services that you may require over the next 5-10 years are detailed below  Some tests may not apply to you based off risk factors and/or age  Screening tests ordered at today's visit but not completed yet may show as past due  Also, please note that scanned in results may not display below  Preventive Screenings:  Service Recommendations Previous Testing/Comments   Colorectal Cancer Screening  · Colonoscopy    · Fecal Occult Blood Test (FOBT)/Fecal Immunochemical Test (FIT)  · Fecal DNA/Cologuard Test  · Flexible Sigmoidoscopy Age: 39-70 years old   Colonoscopy: every 10 years (May be performed more frequently if at higher risk)  OR  FOBT/FIT: every 1 year  OR  Cologuard: every 3 years  OR  Sigmoidoscopy: every 5 years  Screening may be recommended earlier than age 39 if at higher risk for colorectal cancer  Also, an individualized decision between you and your healthcare provider will decide whether screening between the ages of 74-80 would be appropriate   Colonoscopy: 03/02/2022  FOBT/FIT: Not on file  Cologuard: Not on file  Sigmoidoscopy: Not on file          Prostate Cancer Screening Individualized decision between patient and health care provider in men between ages of 53-78   Medicare will cover every 12 months beginning on the day after your 50th birthday PSA: 0 6 ng/mL           Hepatitis C Screening Once for adults born between 1945 and 1965  More frequently in patients at high risk for Hepatitis C Hep C Antibody: 03/23/2017        Diabetes Screening 1-2 times per year if you're at risk for diabetes or have pre-diabetes Fasting glucose: No results in last 5 years (No results in last 5 years)  A1C: No results in last 5 years (No results in last 5 years)      Cholesterol Screening Once every 5 years if you don't have a lipid disorder  May order more often based on risk factors  Lipid panel: 07/07/2022         Other Preventive Screenings Covered by Medicare:  1  Abdominal Aortic Aneurysm (AAA) Screening: covered once if your at risk  You're considered to be at risk if you have a family history of AAA or a male between the age of 73-68 who smoking at least 100 cigarettes in your lifetime  2  Lung Cancer Screening: covers low dose CT scan once per year if you meet all of the following conditions: (1) Age 50-69; (2) No signs or symptoms of lung cancer; (3) Current smoker or have quit smoking within the last 15 years; (4) You have a tobacco smoking history of at least 20 pack years (packs per day x number of years you smoked); (5) You get a written order from a healthcare provider  3  Glaucoma Screening: covered annually if you're considered high risk: (1) You have diabetes OR (2) Family history of glaucoma OR (3)  aged 48 and older OR (3)  American aged 72 and older  3  Osteoporosis Screening: covered every 2 years if you meet one of the following conditions: (1) Have a vertebral abnormality; (2) On glucocorticoid therapy for more than 3 months; (3) Have primary hyperparathyroidism; (4) On osteoporosis medications and need to assess response to drug therapy  5  HIV Screening: covered annually if you're between the age of 12-76  Also covered annually if you are younger than 13 and older than 72 with risk factors for HIV infection  For pregnant patients, it is covered up to 3 times per pregnancy      Immunizations:  Immunization Recommendations   Influenza Vaccine Annual influenza vaccination during flu season is recommended for all persons aged >= 6 months who do not have contraindications   Pneumococcal Vaccine   * Pneumococcal conjugate vaccine = PCV13 (Prevnar 13), PCV15 (Vaxneuvance), PCV20 (Prevnar 20)  * Pneumococcal polysaccharide vaccine = PPSV23 (Pneumovax) Adults 2364 years old: 1-3 doses may be recommended based on certain risk factors  Adults 72 years old: 1-2 doses may be recommended based off what pneumonia vaccine you previously received   Hepatitis B Vaccine 3 dose series if at intermediate or high risk (ex: diabetes, end stage renal disease, liver disease)   Tetanus (Td) Vaccine - COST NOT COVERED BY MEDICARE PART B Following completion of primary series, a booster dose should be given every 10 years to maintain immunity against tetanus  Td may also be given as tetanus wound prophylaxis  Tdap Vaccine - COST NOT COVERED BY MEDICARE PART B Recommended at least once for all adults  For pregnant patients, recommended with each pregnancy  Shingles Vaccine (Shingrix) - COST NOT COVERED BY MEDICARE PART B  2 shot series recommended in those aged 48 and above     Health Maintenance Due:      Topic Date Due   • Colorectal Cancer Screening  03/02/2025   • Hepatitis C Screening  Completed     Immunizations Due:      Topic Date Due   • COVID-19 Vaccine (4 - Booster for Moderna series) 04/04/2022   • Influenza Vaccine (1) 09/01/2022     Advance Directives   What are advance directives? Advance directives are legal documents that state your wishes and plans for medical care  These plans are made ahead of time in case you lose your ability to make decisions for yourself  Advance directives can apply to any medical decision, such as the treatments you want, and if you want to donate organs  What are the types of advance directives? There are many types of advance directives, and each state has rules about how to use them  You may choose a combination of any of the following:  · Living will: This is a written record of the treatment you want  You can also choose which treatments you do not want, which to limit, and which to stop at a certain time  This includes surgery, medicine, IV fluid, and tube feedings     · Durable power of  for healthcare [facility]): This is a written record that states who you want to make healthcare choices for you when you are unable to make them for yourself  This person, called a proxy, is usually a family member or a friend  You may choose more than 1 proxy  · Do not resuscitate (DNR) order:  A DNR order is used in case your heart stops beating or you stop breathing  It is a request not to have certain forms of treatment, such as CPR  A DNR order may be included in other types of advance directives  · Medical directive: This covers the care that you want if you are in a coma, near death, or unable to make decisions for yourself  You can list the treatments you want for each condition  Treatment may include pain medicine, surgery, blood transfusions, dialysis, IV or tube feedings, and a ventilator (breathing machine)  · Values history: This document has questions about your views, beliefs, and how you feel and think about life  This information can help others choose the care that you would choose  Why are advance directives important? An advance directive helps you control your care  Although spoken wishes may be used, it is better to have your wishes written down  Spoken wishes can be misunderstood, or not followed  Treatments may be given even if you do not want them  An advance directive may make it easier for your family to make difficult choices about your care  Cigarette Smoking and Your Health   Risks to your health if you smoke:  Nicotine and other chemicals found in tobacco damage every cell in your body  Even if you are a light smoker, you have an increased risk for cancer, heart disease, and lung disease  If you are pregnant or have diabetes, smoking increases your risk for complications  Benefits to your health if you stop smoking:   · You decrease respiratory symptoms such as coughing, wheezing, and shortness of breath     · You reduce your risk for cancers of the lung, mouth, throat, kidney, bladder, pancreas, stomach, and cervix  If you already have cancer, you increase the benefits of chemotherapy  You also reduce your risk for cancer returning or a second cancer from developing  · You reduce your risk for heart disease, blood clots, heart attack, and stroke  · You reduce your risk for lung infections, and diseases such as pneumonia, asthma, chronic bronchitis, and emphysema  · Your circulation improves  More oxygen can be delivered to your body  If you have diabetes, you lower your risk for complications, such as kidney, artery, and eye diseases  You also lower your risk for nerve damage  Nerve damage can lead to amputations, poor vision, and blindness  · You improve your body's ability to heal and to fight infections  For more information and support to stop smoking:   · [employer]  Phone: 5- 788 - [phone]  Web Address: [employer]  Weight Management   Why it is important to manage your weight:  Being overweight increases your risk of health conditions such as heart disease, high blood pressure, type 2 diabetes, and certain types of cancer  It can also increase your risk for osteoarthritis, sleep apnea, and other respiratory problems  Aim for a slow, steady weight loss  Even a small amount of weight loss can lower your risk of health problems  How to lose weight safely:  A safe and healthy way to lose weight is to eat fewer calories and get regular exercise  You can lose up about 1 pound a week by decreasing the number of calories you eat by 500 calories each day  Healthy meal plan for weight management:  A healthy meal plan includes a variety of foods, contains fewer calories, and helps you stay healthy  A healthy meal plan includes the following:  · Eat whole-grain foods more often  A healthy meal plan should contain fiber  Fiber is the part of grains, fruits, and vegetables that is not broken down by your body  Whole-grain foods are healthy and provide extra fiber in your diet   Some examples of whole-grain foods are whole-wheat breads and pastas, oatmeal, brown rice, and bulgur  · Eat a variety of vegetables every day  Include dark, leafy greens such as spinach, kale, connie greens, and mustard greens  Eat yellow and orange vegetables such as carrots, sweet potatoes, and winter squash  · Eat a variety of fruits every day  Choose fresh or canned fruit (canned in its own juice or light syrup) instead of juice  Fruit juice has very little or no fiber  · Eat low-fat dairy foods  Drink fat-free (skim) milk or 1% milk  Eat fat-free yogurt and low-fat cottage cheese  Try low-fat cheeses such as mozzarella and other reduced-fat cheeses  · Choose meat and other protein foods that are low in fat  Choose beans or other legumes such as split peas or lentils  Choose fish, skinless poultry (chicken or turkey), or lean cuts of red meat (beef or pork)  Before you cook meat or poultry, cut off any visible fat  · Use less fat and oil  Try baking foods instead of frying them  Add less fat, such as margarine, sour cream, regular salad dressing and mayonnaise to foods  Eat fewer high-fat foods  Some examples of high-fat foods include french fries, doughnuts, ice cream, and cakes  · Eat fewer sweets  Limit foods and drinks that are high in sugar  This includes candy, cookies, regular soda, and sweetened drinks  Exercise:  Exercise at least 30 minutes per day on most days of the week  Some examples of exercise include walking, biking, dancing, and swimming  You can also fit in more physical activity by taking the stairs instead of the elevator or parking farther away from stores  Ask your healthcare provider about the best exercise plan for you  © Copyright [employer] 2018 Information is for End User's use only and may not be sold, redistributed or otherwise used for commercial purposes   All illustrations and images included in CareNotes® are the copyrighted property of A D A M , Inc  or [employer] [facility] Yes

## 2023-02-14 ENCOUNTER — APPOINTMENT (OUTPATIENT)
Dept: OBGYN | Facility: CLINIC | Age: 48
End: 2023-02-14
Payer: COMMERCIAL

## 2023-02-14 ENCOUNTER — NON-APPOINTMENT (OUTPATIENT)
Age: 48
End: 2023-02-14

## 2023-02-14 VITALS
DIASTOLIC BLOOD PRESSURE: 89 MMHG | HEIGHT: 65 IN | BODY MASS INDEX: 30.32 KG/M2 | WEIGHT: 182 LBS | SYSTOLIC BLOOD PRESSURE: 144 MMHG

## 2023-02-14 PROCEDURE — 99396 PREV VISIT EST AGE 40-64: CPT

## 2023-02-15 LAB — HPV HIGH+LOW RISK DNA PNL CVX: NOT DETECTED

## 2023-02-15 NOTE — HISTORY OF PRESENT ILLNESS
[FreeTextEntry1] : 46 yo p3 here for annual exam. no periods since ablations. \par no hot flashes, no other gyn co.\par hx crysurg cervix\par med hx- bipolar\par  \par

## 2023-03-02 LAB — CYTOLOGY CVX/VAG DOC THIN PREP: NORMAL

## 2023-03-31 ENCOUNTER — APPOINTMENT (OUTPATIENT)
Dept: MAMMOGRAPHY | Facility: CLINIC | Age: 48
End: 2023-03-31
Payer: COMMERCIAL

## 2023-03-31 ENCOUNTER — OUTPATIENT (OUTPATIENT)
Dept: OUTPATIENT SERVICES | Facility: HOSPITAL | Age: 48
LOS: 1 days | End: 2023-03-31
Payer: COMMERCIAL

## 2023-03-31 ENCOUNTER — RESULT REVIEW (OUTPATIENT)
Age: 48
End: 2023-03-31

## 2023-03-31 ENCOUNTER — APPOINTMENT (OUTPATIENT)
Dept: ULTRASOUND IMAGING | Facility: CLINIC | Age: 48
End: 2023-03-31
Payer: COMMERCIAL

## 2023-03-31 DIAGNOSIS — Z98.890 OTHER SPECIFIED POSTPROCEDURAL STATES: Chronic | ICD-10-CM

## 2023-03-31 DIAGNOSIS — Z00.00 ENCOUNTER FOR GENERAL ADULT MEDICAL EXAMINATION WITHOUT ABNORMAL FINDINGS: ICD-10-CM

## 2023-03-31 PROCEDURE — 76641 ULTRASOUND BREAST COMPLETE: CPT | Mod: 26,50

## 2023-03-31 PROCEDURE — 77063 BREAST TOMOSYNTHESIS BI: CPT

## 2023-03-31 PROCEDURE — 77067 SCR MAMMO BI INCL CAD: CPT

## 2023-03-31 PROCEDURE — 77067 SCR MAMMO BI INCL CAD: CPT | Mod: 26

## 2023-03-31 PROCEDURE — 77063 BREAST TOMOSYNTHESIS BI: CPT | Mod: 26

## 2023-03-31 PROCEDURE — 76641 ULTRASOUND BREAST COMPLETE: CPT

## 2023-08-07 ENCOUNTER — OFFICE (OUTPATIENT)
Dept: URBAN - METROPOLITAN AREA CLINIC 113 | Facility: CLINIC | Age: 48
Setting detail: OPHTHALMOLOGY
End: 2023-08-07
Payer: COMMERCIAL

## 2023-08-07 ENCOUNTER — RX ONLY (RX ONLY)
Age: 48
End: 2023-08-07

## 2023-08-07 DIAGNOSIS — H52.13: ICD-10-CM

## 2023-08-07 DIAGNOSIS — Z01.00: ICD-10-CM

## 2023-08-07 PROCEDURE — 92014 COMPRE OPH EXAM EST PT 1/>: CPT | Performed by: OPTOMETRIST

## 2023-08-07 PROCEDURE — 92310 CONTACT LENS FITTING OU: CPT | Performed by: OPTOMETRIST

## 2023-08-07 ASSESSMENT — SPHEQUIV_DERIVED
OS_SPHEQUIV: -6.75
OD_SPHEQUIV: -8.125
OS_SPHEQUIV: -6.75
OD_SPHEQUIV: -8
OS_SPHEQUIV: -6.875
OD_SPHEQUIV: -7.375

## 2023-08-07 ASSESSMENT — REFRACTION_MANIFEST
OS_CYLINDER: -1.00
OS_CYLINDER: -1.25
OS_SPHERE: -6.25
OD_CYLINDER: -0.75
OS_AXIS: 075
OD_VA1: 20/20
OS_SPHERE: -6.25
OD_VA1: 20/20
OD_SPHERE: -7.50
OS_VA1: 20/20
OD_AXIS: 080
OD_SPHERE: -7.00
OD_CYLINDER: -1.00
OS_VA1: 20/20
OD_AXIS: 085
OS_AXIS: 075

## 2023-08-07 ASSESSMENT — REFRACTION_CURRENTRX
OD_AXIS: 180
OD_AXIS: 095
OD_VPRISM_DIRECTION: SV
OS_VPRISM_DIRECTION: SV
OS_SPHERE: -5.00
OS_SPHERE: -5.50
OD_OVR_VA: 20/
OD_SPHERE: -6.50
OD_SPHERE: -6.00
OS_CYLINDER: SPH
OS_OVR_VA: 20/
OD_OVR_VA: 20/
OD_CYLINDER: -0.75
OD_CYLINDER: -0.25
OS_AXIS: 075
OS_CYLINDER: -1.25
OS_OVR_VA: 20/

## 2023-08-07 ASSESSMENT — REFRACTION_AUTOREFRACTION
OS_CYLINDER: -1.00
OS_AXIS: 085
OD_CYLINDER: -0.75
OD_SPHERE: -7.75
OS_SPHERE: -6.25
OD_AXIS: 085

## 2023-08-07 ASSESSMENT — CONFRONTATIONAL VISUAL FIELD TEST (CVF)
OS_FINDINGS: FULL
OD_FINDINGS: FULL

## 2023-08-07 ASSESSMENT — TONOMETRY
OS_IOP_MMHG: 17
OD_IOP_MMHG: 14

## 2023-08-07 ASSESSMENT — AXIALLENGTH_DERIVED
OS_AL: 26.8457
OS_AL: 26.8457
OD_AL: 27.287
OD_AL: 27.6836
OS_AL: 26.9089
OD_AL: 27.6167

## 2023-08-07 ASSESSMENT — KERATOMETRY
OD_K2POWER_DIOPTERS: 42.75
OS_AXISANGLE_DEGREES: 150
OD_K1POWER_DIOPTERS: 42.50
OS_K2POWER_DIOPTERS: 43.00
OS_K1POWER_DIOPTERS: 42.75
OD_AXISANGLE_DEGREES: 032

## 2023-08-07 ASSESSMENT — VISUAL ACUITY
OS_BCVA: 20/25
OD_BCVA: 20/40

## 2023-09-06 ENCOUNTER — APPOINTMENT (OUTPATIENT)
Dept: SURGERY | Facility: CLINIC | Age: 48
End: 2023-09-06
Payer: COMMERCIAL

## 2023-09-06 PROCEDURE — 99204 OFFICE O/P NEW MOD 45 MIN: CPT

## 2023-09-06 NOTE — REASON FOR VISIT
[Initial Consultation] : an initial consultation for [FreeTextEntry2] : hyperparathyroidism in the setting of a multinodular goiter

## 2023-09-06 NOTE — ASSESSMENT
[FreeTextEntry1] : Assessment:  48-year-old woman, with history significant for Lithium use and new-onset mild renal insufficiency, presents with hyperparathyroidism in the setting of bilateral thyroid nodules including a large and enlarging left thyroid nodule.  Plan: - The nature of hyperparathyroidism, long-term risks, and indications for parathyroidectomy were first discussed with Ms. Chavis.  Considering her age, the degree of her prior hypercalcemia, and that she has new-onset mild renal insufficiency, provided that she does not have low urinary calcium, I have recommended an elective parathyroidectomy with intraoperative iPTH monitoring.  - Will therefore check a 24-hour urinary calcium and arrange for a Sestamibi scan for localization. - The nature of thyroid nodules and indications for thyroidectomy were then discussed and I explained that a biopsy in general is warranted for solid thyroid nodules > 1 cm in diameter, complex thyroid nodules > 1.5 cm in diameter, and spongiform thyroid nodules > 2 cm in diameter.  Based upon these guidelines and her last ultrasound, I explained that only her left thyroid nodules met RHYS criteria for a biopsy.  I then explained that thyroid nodules measuring greater than 4 cm in diameter have an increased risk for malignancy as well as false negative biopsies.  Considering that she will be undergoing parathyroid surgery and that her left thyroid nodules measures greater than 4 cm and has been enlarging, I have recommended that she undergo a concurrent left thyroid lobectomy with possible total thyroidectomy and central neck dissection if the intraoperative findings reveal malignancy.  I then explained the possibility of requiring a completion thyroidectomy in the event that the final pathology were to reveal an advanced malignancy. - The risks of surgery, including but not limited to, bleeding/hematoma formation, infection, damage to surrounding structures (namely the recurrent laryngeal nerve with resultant transient or permanent hoarseness of voice and the external branch of the superior laryngeal nerve with resultant change in pitch/projection of voice), transient or permanent hypocalcemia, failed exploration, wound healing issues including internal/external scarring, regional numbness, and seroma formation, and the need for thyroid hormone replacement, were discussed with the patient and all questions were answered. - The patient expressed understanding of the above and agrees with this plan.  She was instructed to follow-up after she completes the 24-hour urinary calcium and Sestamibi scan to review the results and further management.

## 2023-09-06 NOTE — HISTORY OF PRESENT ILLNESS
[de-identified] : Ms. Chavis is a 48-year-old woman, with history significant for Lithium use and new-onset mild renal insufficiency, who presents for initial evaluation of hyperparathyroidism in the setting of a multinodular goiter.  She states that she was diagnosed with hyperparathyroidism approximately 5 years ago and was recommended to undergo observation by her endocrinologist, Dr. Mart.  In addition, she was noted to have thyroid nodules around that time and has since undergone several follow-up ultrasounds and two benign FNA biopsies.  Labs performed 08/14/2023 revealed a serum calcium = 8.9, an elevated iPTH = 94, a mildly elevated serum creatinine = 1.09, and 25-OH vitamin D = 38.2.  Her serum calcium was previously elevated = 11.1 (05/01/2022).  Her most recent neck ultrasound, performed 04/03/2023 ([facility]), reported right lower 0.9 cm complex thyroid nodule, a right lower 0.7 cm spongiform thyroid nodule, a right lower 1.2 cm complex thyroid nodule, a left lower 5.5 cm complex thyroid nodule, and left upper 2.0 cm complex thyroid nodule.  There was no lymphadenopathy appreciated.  When compared to a prior ultrasound performed in April, 2021, the dominant left thyroid nodule had gotten larger while the remainder of her nodules had remained relatively stable in size.  FNA biopsies of both left thyroid nodules, performed on 06/04/2018 ([facility]) at which time the upper nodule measured 2.1 cm and the lower nodule measured 2.2 cm, were both felt to be consistent with benign follicular nodules ([place]).  DEXA scan, performed 11/14/2022 ([facility]), reported normal bone density.  She denies known family history of thyroid or parathyroid disorders or personal history of dysphagia, recent voice changes, recent fractures, or kidney stones.

## 2023-09-06 NOTE — PHYSICAL EXAM
[Normal] : orientation to person, place, and time: normal [R] : deviated to the right [de-identified] : There is a large left thyroid nodule appreciated on inspection which is relatively soft and results in right-sided tracheal deviation.  A limited in-office ultrasound revealed a mostly homogeneous thyroid with multiple small heterogeneous and cystic right thyroid nodules.  There is a large left thyroid nodule which appears to most likely be comprised of colloid that expands the left lobe as well as an adjacent smaller predominantly solid nodule superiorly and laterally.  The inferior aspect of the left lobe is able to be appreciated with neck extension. [de-identified] : Extremities: FITZPATRICK x 4.   Skin: No obvious skin lesions.   Voice: clear

## 2023-09-08 VITALS
SYSTOLIC BLOOD PRESSURE: 127 MMHG | HEART RATE: 70 BPM | DIASTOLIC BLOOD PRESSURE: 87 MMHG | HEIGHT: 65 IN | BODY MASS INDEX: 31.32 KG/M2 | WEIGHT: 188 LBS

## 2023-09-24 ENCOUNTER — NON-APPOINTMENT (OUTPATIENT)
Age: 48
End: 2023-09-24

## 2023-09-25 ENCOUNTER — NON-APPOINTMENT (OUTPATIENT)
Age: 48
End: 2023-09-25

## 2023-09-25 ENCOUNTER — OUTPATIENT (OUTPATIENT)
Dept: OUTPATIENT SERVICES | Facility: HOSPITAL | Age: 48
LOS: 1 days | End: 2023-09-25
Payer: COMMERCIAL

## 2023-09-25 ENCOUNTER — APPOINTMENT (OUTPATIENT)
Dept: NUCLEAR MEDICINE | Facility: CLINIC | Age: 48
End: 2023-09-25

## 2023-09-25 DIAGNOSIS — Z98.890 OTHER SPECIFIED POSTPROCEDURAL STATES: Chronic | ICD-10-CM

## 2023-09-25 DIAGNOSIS — E04.2 NONTOXIC MULTINODULAR GOITER: ICD-10-CM

## 2023-09-25 PROCEDURE — 78072 PARATHYRD PLANAR W/SPECT&CT: CPT | Mod: 26

## 2023-09-26 LAB
CAU: 7 MG/DL
CREAT 24H UR-MCNC: 0.7 G/24 H
CREAT ?TM UR-MCNC: 27 MG/DL
PROT ?TM UR-MCNC: 24 HR
SPECIMEN VOL 24H UR: 186 MG/24 H
SPECIMEN VOL 24H UR: 2650 ML

## 2023-10-05 ENCOUNTER — APPOINTMENT (OUTPATIENT)
Dept: SURGERY | Facility: CLINIC | Age: 48
End: 2023-10-05
Payer: COMMERCIAL

## 2023-10-05 VITALS
HEIGHT: 65 IN | DIASTOLIC BLOOD PRESSURE: 95 MMHG | BODY MASS INDEX: 31.32 KG/M2 | WEIGHT: 188 LBS | OXYGEN SATURATION: 98 % | SYSTOLIC BLOOD PRESSURE: 153 MMHG | HEART RATE: 66 BPM

## 2023-10-05 PROCEDURE — 99213 OFFICE O/P EST LOW 20 MIN: CPT

## 2023-10-09 ENCOUNTER — OUTPATIENT (OUTPATIENT)
Dept: OUTPATIENT SERVICES | Facility: HOSPITAL | Age: 48
LOS: 1 days | End: 2023-10-09

## 2023-10-09 ENCOUNTER — APPOINTMENT (OUTPATIENT)
Dept: MRI IMAGING | Facility: CLINIC | Age: 48
End: 2023-10-09
Payer: COMMERCIAL

## 2023-10-09 ENCOUNTER — OUTPATIENT (OUTPATIENT)
Dept: OUTPATIENT SERVICES | Facility: HOSPITAL | Age: 48
LOS: 1 days | End: 2023-10-09
Payer: COMMERCIAL

## 2023-10-09 DIAGNOSIS — Z00.8 ENCOUNTER FOR OTHER GENERAL EXAMINATION: ICD-10-CM

## 2023-10-09 DIAGNOSIS — E21.3 HYPERPARATHYROIDISM, UNSPECIFIED: ICD-10-CM

## 2023-10-09 DIAGNOSIS — Z98.890 OTHER SPECIFIED POSTPROCEDURAL STATES: Chronic | ICD-10-CM

## 2023-10-09 PROCEDURE — 70543 MRI ORBT/FAC/NCK W/O &W/DYE: CPT

## 2023-10-09 PROCEDURE — 70543 MRI ORBT/FAC/NCK W/O &W/DYE: CPT | Mod: 26

## 2023-10-09 PROCEDURE — A9585: CPT

## 2023-10-17 DIAGNOSIS — E04.2 NONTOXIC MULTINODULAR GOITER: ICD-10-CM

## 2023-10-25 ENCOUNTER — APPOINTMENT (OUTPATIENT)
Dept: THORACIC SURGERY | Facility: CLINIC | Age: 48
End: 2023-10-25
Payer: COMMERCIAL

## 2023-10-25 VITALS
SYSTOLIC BLOOD PRESSURE: 138 MMHG | WEIGHT: 195 LBS | HEIGHT: 65 IN | HEART RATE: 72 BPM | OXYGEN SATURATION: 100 % | BODY MASS INDEX: 32.49 KG/M2 | DIASTOLIC BLOOD PRESSURE: 95 MMHG

## 2023-10-25 PROCEDURE — 99204 OFFICE O/P NEW MOD 45 MIN: CPT

## 2023-10-25 RX ORDER — MUPIROCIN 20 MG/G
2 OINTMENT TOPICAL
Qty: 1 | Refills: 7 | Status: DISCONTINUED | COMMUNITY
Start: 2022-03-07 | End: 2023-10-25

## 2023-10-25 RX ORDER — DOXYCYCLINE HYCLATE 100 MG/1
100 TABLET ORAL
Qty: 14 | Refills: 0 | Status: DISCONTINUED | COMMUNITY
Start: 2022-07-14 | End: 2023-10-25

## 2023-10-25 RX ORDER — AMLODIPINE BESYLATE 2.5 MG/1
2.5 TABLET ORAL
Refills: 0 | Status: ACTIVE | COMMUNITY

## 2023-10-25 RX ORDER — MISOPROSTOL 200 UG/1
200 TABLET ORAL
Qty: 2 | Refills: 0 | Status: DISCONTINUED | COMMUNITY
Start: 2022-05-12 | End: 2023-10-25

## 2023-11-09 ENCOUNTER — APPOINTMENT (OUTPATIENT)
Dept: DERMATOLOGY | Facility: CLINIC | Age: 48
End: 2023-11-09

## 2023-11-25 ENCOUNTER — INPATIENT (INPATIENT)
Facility: HOSPITAL | Age: 48
LOS: 2 days | Discharge: HOME | DRG: 42 | End: 2023-11-28
Attending: INTERNAL MEDICINE | Admitting: STUDENT IN AN ORGANIZED HEALTH CARE EDUCATION/TRAINING PROGRAM
Payer: COMMERCIAL

## 2023-11-25 VITALS
TEMPERATURE: 98 F | WEIGHT: 160.06 LBS | HEIGHT: 66 IN | HEART RATE: 65 BPM | SYSTOLIC BLOOD PRESSURE: 135 MMHG | RESPIRATION RATE: 20 BRPM | DIASTOLIC BLOOD PRESSURE: 87 MMHG | OXYGEN SATURATION: 99 %

## 2023-11-25 DIAGNOSIS — Z98.890 OTHER SPECIFIED POSTPROCEDURAL STATES: Chronic | ICD-10-CM

## 2023-11-25 LAB
BASOPHILS # BLD AUTO: 0.06 K/UL — SIGNIFICANT CHANGE UP (ref 0–0.2)
BASOPHILS # BLD AUTO: 0.06 K/UL — SIGNIFICANT CHANGE UP (ref 0–0.2)
BASOPHILS NFR BLD AUTO: 0.5 % — SIGNIFICANT CHANGE UP (ref 0–2)
BASOPHILS NFR BLD AUTO: 0.5 % — SIGNIFICANT CHANGE UP (ref 0–2)
EOSINOPHIL # BLD AUTO: 0.07 K/UL — SIGNIFICANT CHANGE UP (ref 0–0.5)
EOSINOPHIL # BLD AUTO: 0.07 K/UL — SIGNIFICANT CHANGE UP (ref 0–0.5)
EOSINOPHIL NFR BLD AUTO: 0.6 % — SIGNIFICANT CHANGE UP (ref 0–6)
EOSINOPHIL NFR BLD AUTO: 0.6 % — SIGNIFICANT CHANGE UP (ref 0–6)
HCT VFR BLD CALC: 44.2 % — SIGNIFICANT CHANGE UP (ref 34.5–45)
HCT VFR BLD CALC: 44.2 % — SIGNIFICANT CHANGE UP (ref 34.5–45)
HGB BLD-MCNC: 14.8 G/DL — SIGNIFICANT CHANGE UP (ref 11.5–15.5)
HGB BLD-MCNC: 14.8 G/DL — SIGNIFICANT CHANGE UP (ref 11.5–15.5)
IMM GRANULOCYTES NFR BLD AUTO: 0.6 % — SIGNIFICANT CHANGE UP (ref 0–0.9)
IMM GRANULOCYTES NFR BLD AUTO: 0.6 % — SIGNIFICANT CHANGE UP (ref 0–0.9)
LYMPHOCYTES # BLD AUTO: 0.79 K/UL — LOW (ref 1–3.3)
LYMPHOCYTES # BLD AUTO: 0.79 K/UL — LOW (ref 1–3.3)
LYMPHOCYTES # BLD AUTO: 7.1 % — LOW (ref 13–44)
LYMPHOCYTES # BLD AUTO: 7.1 % — LOW (ref 13–44)
MCHC RBC-ENTMCNC: 32.8 PG — SIGNIFICANT CHANGE UP (ref 27–34)
MCHC RBC-ENTMCNC: 32.8 PG — SIGNIFICANT CHANGE UP (ref 27–34)
MCHC RBC-ENTMCNC: 33.5 GM/DL — SIGNIFICANT CHANGE UP (ref 32–36)
MCHC RBC-ENTMCNC: 33.5 GM/DL — SIGNIFICANT CHANGE UP (ref 32–36)
MCV RBC AUTO: 98 FL — SIGNIFICANT CHANGE UP (ref 80–100)
MCV RBC AUTO: 98 FL — SIGNIFICANT CHANGE UP (ref 80–100)
MONOCYTES # BLD AUTO: 0.37 K/UL — SIGNIFICANT CHANGE UP (ref 0–0.9)
MONOCYTES # BLD AUTO: 0.37 K/UL — SIGNIFICANT CHANGE UP (ref 0–0.9)
MONOCYTES NFR BLD AUTO: 3.3 % — SIGNIFICANT CHANGE UP (ref 2–14)
MONOCYTES NFR BLD AUTO: 3.3 % — SIGNIFICANT CHANGE UP (ref 2–14)
NEUTROPHILS # BLD AUTO: 9.77 K/UL — HIGH (ref 1.8–7.4)
NEUTROPHILS # BLD AUTO: 9.77 K/UL — HIGH (ref 1.8–7.4)
NEUTROPHILS NFR BLD AUTO: 87.9 % — HIGH (ref 43–77)
NEUTROPHILS NFR BLD AUTO: 87.9 % — HIGH (ref 43–77)
PLATELET # BLD AUTO: 245 K/UL — SIGNIFICANT CHANGE UP (ref 150–400)
PLATELET # BLD AUTO: 245 K/UL — SIGNIFICANT CHANGE UP (ref 150–400)
RBC # BLD: 4.51 M/UL — SIGNIFICANT CHANGE UP (ref 3.8–5.2)
RBC # BLD: 4.51 M/UL — SIGNIFICANT CHANGE UP (ref 3.8–5.2)
RBC # FLD: 11.9 % — SIGNIFICANT CHANGE UP (ref 10.3–14.5)
RBC # FLD: 11.9 % — SIGNIFICANT CHANGE UP (ref 10.3–14.5)
WBC # BLD: 11.13 K/UL — HIGH (ref 3.8–10.5)
WBC # BLD: 11.13 K/UL — HIGH (ref 3.8–10.5)
WBC # FLD AUTO: 11.13 K/UL — HIGH (ref 3.8–10.5)
WBC # FLD AUTO: 11.13 K/UL — HIGH (ref 3.8–10.5)

## 2023-11-25 PROCEDURE — 40831 REPAIR MOUTH LACERATION: CPT

## 2023-11-25 PROCEDURE — 99285 EMERGENCY DEPT VISIT HI MDM: CPT | Mod: 25

## 2023-11-25 RX ORDER — SODIUM CHLORIDE 9 MG/ML
1000 INJECTION INTRAMUSCULAR; INTRAVENOUS; SUBCUTANEOUS ONCE
Refills: 0 | Status: COMPLETED | OUTPATIENT
Start: 2023-11-25 | End: 2023-11-25

## 2023-11-25 NOTE — ED PROVIDER NOTE - CLINICAL SUMMARY MEDICAL DECISION MAKING FREE TEXT BOX
hx and physical as noted. will eval for infectious, metabolic etiology of increased seizures as well as screen for structural abnormalities/intracranial pathology with ct head. will need laceration repairs (signed out to marisa brennan and lamar), epilepsy/neurology consult and admission

## 2023-11-25 NOTE — ED PROVIDER NOTE - PHYSICAL EXAMINATION
PHYSICAL EXAM:   General: well-appearing, appears stated age, not in extremis   HEENT: large gaping jagged laceration to anterior lower mandibular mucosal/buccal membrane, small laceration to inside of lower lip, neither through and through, neither crossing vermillion border. PERRLA, airway patent  Cardiovascular: regular rate and rhythm, + S1/S2, no murmurs, rubs, gallops appreciated  Respiratory: clear to auscultation bilaterally, good aeration bilaterally, nonlabored respirations  Abdominal: soft, nontender, nondistended, no rebound, guarding or rigidity  Neuro: awake alert interactive. CN2-12 grossly intact, Strength 5/5 in upper and lower extremities. Sensation intact to light touch in upper and lower extremities. finger-to-nose WNL.   Psychiatric: appropriate mood and affect.   Skin: as noted  -Vaishnavi Rivera MD Attending Physician

## 2023-11-25 NOTE — ED ADULT TRIAGE NOTE - CHIEF COMPLAINT QUOTE
pt BIBEMS for seizures  who is at bedside states last time she had a seizure was several years ago and today she had 3 seizures prior to coming to hospital. patient endorses drinking alcohol and denies being ever placed on seizure medication. glucose obtained prior to triage pt awake and alert note blood in mouth due to biting of tongue

## 2023-11-25 NOTE — ED PROVIDER NOTE - PROGRESS NOTE DETAILS
Had another stiffening, unresponsive episode after returning from bathroom. Returned to baseline without post-ictal state. Will be admitted to EMU.

## 2023-11-25 NOTE — ED PROVIDER NOTE - OBJECTIVE STATEMENT
49 yo F bipolar on lithium, latuda zoloft, htn on atenolol, calciferol p/w recurrent seizures.  only history of seizure was 13 years ago while delivering son, also a couple of years ago while getting neck massage, At the time, whole body stiffens, breathes irregular, eyes roll back/pupils dilate. Two similar episode today  after a few drinks witnessed by  1945. Had another episode when she got home and hit her chin and mouth on sofa. Loss of consciousness during episodes. Last episode with a post ictal state of confusion. First two seizures were approx 15 min apart, and last one was 20 min after that. Each lasted 15-25 seconds. Did return to baseline LOC between episodes. No prceding head trauma, no recent illness, no HA, no fever. Feels better now. Is not on antiepileptics at this time. Endorses 1 shot of whiskey per day, no hx alcohol withdrawal.

## 2023-11-26 DIAGNOSIS — R56.9 UNSPECIFIED CONVULSIONS: ICD-10-CM

## 2023-11-26 LAB
ALBUMIN SERPL ELPH-MCNC: 4.1 G/DL — SIGNIFICANT CHANGE UP (ref 3.3–5.2)
ALBUMIN SERPL ELPH-MCNC: 4.1 G/DL — SIGNIFICANT CHANGE UP (ref 3.3–5.2)
ALBUMIN SERPL ELPH-MCNC: 4.7 G/DL — SIGNIFICANT CHANGE UP (ref 3.3–5.2)
ALBUMIN SERPL ELPH-MCNC: 4.7 G/DL — SIGNIFICANT CHANGE UP (ref 3.3–5.2)
ALP SERPL-CCNC: 82 U/L — SIGNIFICANT CHANGE UP (ref 40–120)
ALP SERPL-CCNC: 82 U/L — SIGNIFICANT CHANGE UP (ref 40–120)
ALP SERPL-CCNC: 87 U/L — SIGNIFICANT CHANGE UP (ref 40–120)
ALP SERPL-CCNC: 87 U/L — SIGNIFICANT CHANGE UP (ref 40–120)
ALT FLD-CCNC: 15 U/L — SIGNIFICANT CHANGE UP
ALT FLD-CCNC: 15 U/L — SIGNIFICANT CHANGE UP
ALT FLD-CCNC: 18 U/L — SIGNIFICANT CHANGE UP
ALT FLD-CCNC: 18 U/L — SIGNIFICANT CHANGE UP
ANION GAP SERPL CALC-SCNC: 11 MMOL/L — SIGNIFICANT CHANGE UP (ref 5–17)
APPEARANCE UR: CLEAR — SIGNIFICANT CHANGE UP
APPEARANCE UR: CLEAR — SIGNIFICANT CHANGE UP
AST SERPL-CCNC: 17 U/L — SIGNIFICANT CHANGE UP
AST SERPL-CCNC: 17 U/L — SIGNIFICANT CHANGE UP
AST SERPL-CCNC: 24 U/L — SIGNIFICANT CHANGE UP
AST SERPL-CCNC: 24 U/L — SIGNIFICANT CHANGE UP
BACTERIA # UR AUTO: ABNORMAL /HPF
BACTERIA # UR AUTO: ABNORMAL /HPF
BILIRUB SERPL-MCNC: 0.2 MG/DL — LOW (ref 0.4–2)
BILIRUB SERPL-MCNC: 0.2 MG/DL — LOW (ref 0.4–2)
BILIRUB SERPL-MCNC: <0.2 MG/DL — LOW (ref 0.4–2)
BILIRUB SERPL-MCNC: <0.2 MG/DL — LOW (ref 0.4–2)
BILIRUB UR-MCNC: NEGATIVE — SIGNIFICANT CHANGE UP
BILIRUB UR-MCNC: NEGATIVE — SIGNIFICANT CHANGE UP
BUN SERPL-MCNC: 11.8 MG/DL — SIGNIFICANT CHANGE UP (ref 8–20)
BUN SERPL-MCNC: 11.8 MG/DL — SIGNIFICANT CHANGE UP (ref 8–20)
BUN SERPL-MCNC: 13.5 MG/DL — SIGNIFICANT CHANGE UP (ref 8–20)
BUN SERPL-MCNC: 13.5 MG/DL — SIGNIFICANT CHANGE UP (ref 8–20)
CALCIUM SERPL-MCNC: 10.4 MG/DL — SIGNIFICANT CHANGE UP (ref 8.4–10.5)
CALCIUM SERPL-MCNC: 10.4 MG/DL — SIGNIFICANT CHANGE UP (ref 8.4–10.5)
CALCIUM SERPL-MCNC: 9.6 MG/DL — SIGNIFICANT CHANGE UP (ref 8.4–10.5)
CALCIUM SERPL-MCNC: 9.6 MG/DL — SIGNIFICANT CHANGE UP (ref 8.4–10.5)
CAST: 1 /LPF — SIGNIFICANT CHANGE UP (ref 0–4)
CAST: 1 /LPF — SIGNIFICANT CHANGE UP (ref 0–4)
CHLORIDE SERPL-SCNC: 105 MMOL/L — SIGNIFICANT CHANGE UP (ref 96–108)
CHLORIDE SERPL-SCNC: 105 MMOL/L — SIGNIFICANT CHANGE UP (ref 96–108)
CHLORIDE SERPL-SCNC: 110 MMOL/L — HIGH (ref 96–108)
CHLORIDE SERPL-SCNC: 110 MMOL/L — HIGH (ref 96–108)
CO2 SERPL-SCNC: 24 MMOL/L — SIGNIFICANT CHANGE UP (ref 22–29)
CO2 SERPL-SCNC: 24 MMOL/L — SIGNIFICANT CHANGE UP (ref 22–29)
CO2 SERPL-SCNC: 26 MMOL/L — SIGNIFICANT CHANGE UP (ref 22–29)
CO2 SERPL-SCNC: 26 MMOL/L — SIGNIFICANT CHANGE UP (ref 22–29)
COLOR SPEC: YELLOW — SIGNIFICANT CHANGE UP
COLOR SPEC: YELLOW — SIGNIFICANT CHANGE UP
CREAT SERPL-MCNC: 1.07 MG/DL — SIGNIFICANT CHANGE UP (ref 0.5–1.3)
CREAT SERPL-MCNC: 1.07 MG/DL — SIGNIFICANT CHANGE UP (ref 0.5–1.3)
CREAT SERPL-MCNC: 1.26 MG/DL — SIGNIFICANT CHANGE UP (ref 0.5–1.3)
CREAT SERPL-MCNC: 1.26 MG/DL — SIGNIFICANT CHANGE UP (ref 0.5–1.3)
DIFF PNL FLD: ABNORMAL
DIFF PNL FLD: ABNORMAL
EGFR: 52 ML/MIN/1.73M2 — LOW
EGFR: 52 ML/MIN/1.73M2 — LOW
EGFR: 63 ML/MIN/1.73M2 — SIGNIFICANT CHANGE UP
EGFR: 63 ML/MIN/1.73M2 — SIGNIFICANT CHANGE UP
ETHANOL SERPL-MCNC: 44 MG/DL — HIGH (ref 0–9)
ETHANOL SERPL-MCNC: 44 MG/DL — HIGH (ref 0–9)
GLUCOSE SERPL-MCNC: 102 MG/DL — HIGH (ref 70–99)
GLUCOSE SERPL-MCNC: 102 MG/DL — HIGH (ref 70–99)
GLUCOSE SERPL-MCNC: 114 MG/DL — HIGH (ref 70–99)
GLUCOSE SERPL-MCNC: 114 MG/DL — HIGH (ref 70–99)
GLUCOSE UR QL: NEGATIVE MG/DL — SIGNIFICANT CHANGE UP
GLUCOSE UR QL: NEGATIVE MG/DL — SIGNIFICANT CHANGE UP
HCG SERPL-ACNC: <4 MIU/ML — SIGNIFICANT CHANGE UP
HCG SERPL-ACNC: <4 MIU/ML — SIGNIFICANT CHANGE UP
HCT VFR BLD CALC: 40.7 % — SIGNIFICANT CHANGE UP (ref 34.5–45)
HCT VFR BLD CALC: 40.7 % — SIGNIFICANT CHANGE UP (ref 34.5–45)
HGB BLD-MCNC: 13.4 G/DL — SIGNIFICANT CHANGE UP (ref 11.5–15.5)
HGB BLD-MCNC: 13.4 G/DL — SIGNIFICANT CHANGE UP (ref 11.5–15.5)
KETONES UR-MCNC: NEGATIVE MG/DL — SIGNIFICANT CHANGE UP
KETONES UR-MCNC: NEGATIVE MG/DL — SIGNIFICANT CHANGE UP
LACTATE BLDV-MCNC: 1.8 MMOL/L — SIGNIFICANT CHANGE UP (ref 0.5–2)
LACTATE BLDV-MCNC: 1.8 MMOL/L — SIGNIFICANT CHANGE UP (ref 0.5–2)
LEUKOCYTE ESTERASE UR-ACNC: ABNORMAL
LEUKOCYTE ESTERASE UR-ACNC: ABNORMAL
LITHIUM SERPL-MCNC: 0.54 MMOL/L — SIGNIFICANT CHANGE UP (ref 0.5–1.5)
LITHIUM SERPL-MCNC: 0.54 MMOL/L — SIGNIFICANT CHANGE UP (ref 0.5–1.5)
MAGNESIUM SERPL-MCNC: 1.8 MG/DL — SIGNIFICANT CHANGE UP (ref 1.8–2.6)
MAGNESIUM SERPL-MCNC: 1.8 MG/DL — SIGNIFICANT CHANGE UP (ref 1.8–2.6)
MCHC RBC-ENTMCNC: 32.2 PG — SIGNIFICANT CHANGE UP (ref 27–34)
MCHC RBC-ENTMCNC: 32.2 PG — SIGNIFICANT CHANGE UP (ref 27–34)
MCHC RBC-ENTMCNC: 32.9 GM/DL — SIGNIFICANT CHANGE UP (ref 32–36)
MCHC RBC-ENTMCNC: 32.9 GM/DL — SIGNIFICANT CHANGE UP (ref 32–36)
MCV RBC AUTO: 97.8 FL — SIGNIFICANT CHANGE UP (ref 80–100)
MCV RBC AUTO: 97.8 FL — SIGNIFICANT CHANGE UP (ref 80–100)
NITRITE UR-MCNC: NEGATIVE — SIGNIFICANT CHANGE UP
NITRITE UR-MCNC: NEGATIVE — SIGNIFICANT CHANGE UP
PH UR: 7 — SIGNIFICANT CHANGE UP (ref 5–8)
PH UR: 7 — SIGNIFICANT CHANGE UP (ref 5–8)
PHOSPHATE SERPL-MCNC: 2.8 MG/DL — SIGNIFICANT CHANGE UP (ref 2.4–4.7)
PHOSPHATE SERPL-MCNC: 2.8 MG/DL — SIGNIFICANT CHANGE UP (ref 2.4–4.7)
PLATELET # BLD AUTO: 236 K/UL — SIGNIFICANT CHANGE UP (ref 150–400)
PLATELET # BLD AUTO: 236 K/UL — SIGNIFICANT CHANGE UP (ref 150–400)
POTASSIUM SERPL-MCNC: 4.2 MMOL/L — SIGNIFICANT CHANGE UP (ref 3.5–5.3)
POTASSIUM SERPL-SCNC: 4.2 MMOL/L — SIGNIFICANT CHANGE UP (ref 3.5–5.3)
PROT SERPL-MCNC: 6.5 G/DL — LOW (ref 6.6–8.7)
PROT SERPL-MCNC: 6.5 G/DL — LOW (ref 6.6–8.7)
PROT SERPL-MCNC: 7.2 G/DL — SIGNIFICANT CHANGE UP (ref 6.6–8.7)
PROT SERPL-MCNC: 7.2 G/DL — SIGNIFICANT CHANGE UP (ref 6.6–8.7)
PROT UR-MCNC: NEGATIVE MG/DL — SIGNIFICANT CHANGE UP
PROT UR-MCNC: NEGATIVE MG/DL — SIGNIFICANT CHANGE UP
RBC # BLD: 4.16 M/UL — SIGNIFICANT CHANGE UP (ref 3.8–5.2)
RBC # BLD: 4.16 M/UL — SIGNIFICANT CHANGE UP (ref 3.8–5.2)
RBC # FLD: 12 % — SIGNIFICANT CHANGE UP (ref 10.3–14.5)
RBC # FLD: 12 % — SIGNIFICANT CHANGE UP (ref 10.3–14.5)
RBC CASTS # UR COMP ASSIST: 1 /HPF — SIGNIFICANT CHANGE UP (ref 0–4)
RBC CASTS # UR COMP ASSIST: 1 /HPF — SIGNIFICANT CHANGE UP (ref 0–4)
SODIUM SERPL-SCNC: 142 MMOL/L — SIGNIFICANT CHANGE UP (ref 135–145)
SODIUM SERPL-SCNC: 142 MMOL/L — SIGNIFICANT CHANGE UP (ref 135–145)
SODIUM SERPL-SCNC: 144 MMOL/L — SIGNIFICANT CHANGE UP (ref 135–145)
SODIUM SERPL-SCNC: 144 MMOL/L — SIGNIFICANT CHANGE UP (ref 135–145)
SP GR SPEC: 1.01 — SIGNIFICANT CHANGE UP (ref 1–1.03)
SP GR SPEC: 1.01 — SIGNIFICANT CHANGE UP (ref 1–1.03)
SQUAMOUS # UR AUTO: 7 /HPF — HIGH (ref 0–5)
SQUAMOUS # UR AUTO: 7 /HPF — HIGH (ref 0–5)
UROBILINOGEN FLD QL: 0.2 MG/DL — SIGNIFICANT CHANGE UP (ref 0.2–1)
UROBILINOGEN FLD QL: 0.2 MG/DL — SIGNIFICANT CHANGE UP (ref 0.2–1)
WBC # BLD: 10.58 K/UL — HIGH (ref 3.8–10.5)
WBC # BLD: 10.58 K/UL — HIGH (ref 3.8–10.5)
WBC # FLD AUTO: 10.58 K/UL — HIGH (ref 3.8–10.5)
WBC # FLD AUTO: 10.58 K/UL — HIGH (ref 3.8–10.5)
WBC UR QL: 3 /HPF — SIGNIFICANT CHANGE UP (ref 0–5)
WBC UR QL: 3 /HPF — SIGNIFICANT CHANGE UP (ref 0–5)

## 2023-11-26 PROCEDURE — 71045 X-RAY EXAM CHEST 1 VIEW: CPT | Mod: 26

## 2023-11-26 PROCEDURE — 70486 CT MAXILLOFACIAL W/O DYE: CPT | Mod: 26,MA

## 2023-11-26 PROCEDURE — 99223 1ST HOSP IP/OBS HIGH 75: CPT

## 2023-11-26 PROCEDURE — 70450 CT HEAD/BRAIN W/O DYE: CPT | Mod: 26,MA

## 2023-11-26 PROCEDURE — 93010 ELECTROCARDIOGRAM REPORT: CPT

## 2023-11-26 PROCEDURE — 72125 CT NECK SPINE W/O DYE: CPT | Mod: 26,MA

## 2023-11-26 RX ORDER — LOSARTAN POTASSIUM 100 MG/1
1 TABLET, FILM COATED ORAL
Qty: 0 | Refills: 0 | DISCHARGE

## 2023-11-26 RX ORDER — AMLODIPINE BESYLATE 2.5 MG/1
5 TABLET ORAL DAILY
Refills: 0 | Status: DISCONTINUED | OUTPATIENT
Start: 2023-11-26 | End: 2023-11-28

## 2023-11-26 RX ORDER — LEVETIRACETAM 250 MG/1
750 TABLET, FILM COATED ORAL ONCE
Refills: 0 | Status: COMPLETED | OUTPATIENT
Start: 2023-11-26 | End: 2023-11-26

## 2023-11-26 RX ORDER — ACETAMINOPHEN 500 MG
650 TABLET ORAL EVERY 6 HOURS
Refills: 0 | Status: DISCONTINUED | OUTPATIENT
Start: 2023-11-26 | End: 2023-11-28

## 2023-11-26 RX ORDER — LURASIDONE HYDROCHLORIDE 40 MG/1
40 TABLET ORAL DAILY
Refills: 0 | Status: DISCONTINUED | OUTPATIENT
Start: 2023-11-26 | End: 2023-11-28

## 2023-11-26 RX ORDER — LITHIUM CARBONATE 300 MG/1
900 TABLET, EXTENDED RELEASE ORAL AT BEDTIME
Refills: 0 | Status: DISCONTINUED | OUTPATIENT
Start: 2023-11-26 | End: 2023-11-28

## 2023-11-26 RX ORDER — SERTRALINE 25 MG/1
75 TABLET, FILM COATED ORAL DAILY
Refills: 0 | Status: DISCONTINUED | OUTPATIENT
Start: 2023-11-26 | End: 2023-11-28

## 2023-11-26 RX ORDER — LANOLIN ALCOHOL/MO/W.PET/CERES
3 CREAM (GRAM) TOPICAL AT BEDTIME
Refills: 0 | Status: DISCONTINUED | OUTPATIENT
Start: 2023-11-26 | End: 2023-11-28

## 2023-11-26 RX ORDER — ONDANSETRON 8 MG/1
4 TABLET, FILM COATED ORAL EVERY 8 HOURS
Refills: 0 | Status: DISCONTINUED | OUTPATIENT
Start: 2023-11-26 | End: 2023-11-28

## 2023-11-26 RX ORDER — LEVETIRACETAM 250 MG/1
500 TABLET, FILM COATED ORAL EVERY 12 HOURS
Refills: 0 | Status: DISCONTINUED | OUTPATIENT
Start: 2023-11-26 | End: 2023-11-26

## 2023-11-26 RX ORDER — ENOXAPARIN SODIUM 100 MG/ML
40 INJECTION SUBCUTANEOUS EVERY 24 HOURS
Refills: 0 | Status: DISCONTINUED | OUTPATIENT
Start: 2023-11-26 | End: 2023-11-28

## 2023-11-26 RX ADMIN — LITHIUM CARBONATE 900 MILLIGRAM(S): 300 TABLET, EXTENDED RELEASE ORAL at 21:24

## 2023-11-26 RX ADMIN — SERTRALINE 75 MILLIGRAM(S): 25 TABLET, FILM COATED ORAL at 11:56

## 2023-11-26 RX ADMIN — LEVETIRACETAM 400 MILLIGRAM(S): 250 TABLET, FILM COATED ORAL at 03:08

## 2023-11-26 RX ADMIN — SODIUM CHLORIDE 1000 MILLILITER(S): 9 INJECTION INTRAMUSCULAR; INTRAVENOUS; SUBCUTANEOUS at 01:41

## 2023-11-26 RX ADMIN — LURASIDONE HYDROCHLORIDE 40 MILLIGRAM(S): 40 TABLET ORAL at 11:56

## 2023-11-26 RX ADMIN — LEVETIRACETAM 400 MILLIGRAM(S): 250 TABLET, FILM COATED ORAL at 06:25

## 2023-11-26 RX ADMIN — AMLODIPINE BESYLATE 5 MILLIGRAM(S): 2.5 TABLET ORAL at 06:26

## 2023-11-26 NOTE — CONSULT NOTE ADULT - ASSESSMENT
The patient is a 48y Female who is followed by neurology because of seizure.  She has a loss of responsiveness with generalized tonic contrac tion and incontinence.  They usually happen once per year and often in setting of migraine.    Seizure  Continuous video EEG with EMU monitoring to better capture and classify seizure activity to choose most appropriate anti-seizure for her.  Seizure precautions  will d/c keppra on EEG, can restart keppra if EEG shows seizure activity  anticipate 48-72 hours of recording.  if no seizures may not start medication    given loss of responsiveness during these episode she cannot operate a motor vehicle until cleared by neurology/epilepsy    discussed with Dr Patel.    will follow with you    Jozef Walls MD PhD   [id number]

## 2023-11-26 NOTE — ED ADULT NURSE NOTE - NSFALLRISKINTERV_ED_ALL_ED

## 2023-11-26 NOTE — H&P ADULT - HISTORY OF PRESENT ILLNESS
48F with PMHX Bipolar Disorder, MDD, Anxiety, Hyperparathyroidism, HTN BIBEMS to [facility] ER for witnessed seizure-like activity x3 episodes. Family at bedside stating hx prior seizure years ago during pregnancy. Toda had 3 seizures lasting 15-30 seconds per episode seperated by 15 minutes. Self-aborted. Pt had additional episode witnessed in ED which also self-resolved. +Tongue Laceration noted. No hx withdrawal seizures before. Has not seen a neurologist. No signs/Sx of acute etoh wd syndrome during admission.    PMHX: Bipolar Disorder,   PSHX: Hernia Repair x2, Tubal Ligation  FamHx: Mother +BRCA/Lung CA  Social Hx: +ETOH Use Disorder. Denies IVDA  NKDA

## 2023-11-26 NOTE — CHART NOTE - NSCHARTNOTEFT_GEN_A_CORE
Pt seen and examined at bedside in ESSU   Pt reports no complaints  On EEG currently  Case discussed with Neuro   Agree with A/P per H&P from earlier today
[facility]    ** PRELIMINARY EEG reviewed until  12:38    - Mild intermittent generalized nonspecific cerebral dysfunction /slowing.   - No seizures recorded so far.      Final report to be produced after completion of the study tomorrow morning.    ____________  Michael Horner MD, ANOOP  Attending Physician, [facility] [place]  ------------------------------------  EEG Reading Room: [phone]  Epilepsy Answering Service after 5PM and before 8:30AM: Ph#: [phone]  Also reachable via TEAMS

## 2023-11-26 NOTE — H&P ADULT - ASSESSMENT
ASSESSMENT:  48F with PMHX Bipolar Disorder, MDD, Anxiety, Hyperparathyroidism, HTN BIBEMS to [facility] ER for witnessed seizure-like activity x3 episodes admitted for recurrent seizures.    PLAN:  Recurrent Seizures, Tongue Laceration   -Admit to Tele  -Neurochecks q4  -Seizure Precautions  -ETOH level 44  -Lithium level 0.54  -Lactate Negative  -CTH/Neck/Maxillofacial WO negative  -Tongue lac sutured in ED  -Keppra 750mg IV x1 in ED  -Continue Keppra 500mg IV BID  -Check EEG  -Defer MRI to Neuro/Epileptology  -Epilepsy consulted by ED    Hyperparathyroidism  -Cinacalcet on hold now x2 weeks for planned outpatient Parathyroidectomy    Bipolar Disorder, MDD  -Zoloft 75mg q24  -Lithium level WNL  -Lithium 900mg qHS  -Latuda 40mg q24    HTN  -Amlodipine 5mg q24  -Monitor VS    VTE PPX  -LMWH 40mg q24/SCDs

## 2023-11-26 NOTE — ED ADULT NURSE NOTE - ED STAT RN HANDOFF DETAILS
pt AAOX4, resp. even and unlabored on RA, pt admitted to tele for seizure precautions, 3 episodes at home and x1 epsiode in ED this morning, pt placed on portable tele monitor for transport, suction set up at bedside, as per MD Etienne hobbs for patient to have water, patent 18g in left AC, pt to have LAC repair before moving over

## 2023-11-26 NOTE — H&P ADULT - NSHPPHYSICALEXAM_GEN_ALL_CORE
Vital Signs Last 24 Hrs  T(C): 36.9 (26 Nov 2023 02:50), Max: 36.9 (26 Nov 2023 02:50)  T(F): 98.4 (26 Nov 2023 02:50), Max: 98.4 (26 Nov 2023 02:50)  HR: 77 (26 Nov 2023 02:50) (65 - 77)  BP: 135/87 (25 Nov 2023 22:14) (135/87 - 135/87)  BP(mean): --  RR: 18 (26 Nov 2023 02:50) (18 - 20)  SpO2: 99% (26 Nov 2023 02:50) (99% - 99%)    Parameters below as of 26 Nov 2023 02:50  Patient On (Oxygen Delivery Method): room air    Constitutional: Well-appearing, NAD, VSS  Head: NC/AT   Eyes: PERRL, EOMI, anicteric sclera, conjunctiva WNL  ENT: Normal Pharynx, MMM, No tonsillar exudate/erythema +tongue laceration  Neck: Supple, Non-tender  Chest: Non-tender, no rashes  Cardio: RRR, s1/s2, no appreciable murmurs/rubs/gallops  Resp: BS CTA bilaterally, no wheezing/rhonchi/rales  Abd: Soft, Non-tender, Non-distended, no rebound/guarding/rigidity  : not examined  Rectal: not examined  MSK: moving all extremities, no motor weakness, full ROM x4  Ext: palpable distal pulses, good capillary refill, no clubbing/cyanosis/edema  Psych: appropriate, cooperative  Neuro: CN II-XII grossly intact, no focal deficits  Skin: Warm/Dry. No rashes.

## 2023-11-26 NOTE — CONSULT NOTE ADULT - SUBJECTIVE AND OBJECTIVE BOX
[facility]                                     Neurology at [place]                                 Kavita Che, & Vinod                                  [address]                                        [place], [place], [address]                                             [phone]    CC: seizures  HPI:  The patient is a 48y Female who presented with 3 seizures at home and one after coming to the ED.  she says that since college she has had seizures, on average once per year.  they consist of her tensing up and staring with unresponsiveness.  they last about 30 seconds.  She can only remember when she comes out of it.  Yesterday she had urinary incontinence with this and she fell and hit her head on a chair and suffered an tongue laceration. She has one drink daily and has never had alcohol withdrawal seizures.  Neurology is asked to evaluate.    PAST MEDICAL & SURGICAL HISTORY:  Bipolar illness      H/O hernia repair      MEDICATIONS  (STANDING):  amLODIPine   Tablet 5 milliGRAM(s) Oral daily  enoxaparin Injectable 40 milliGRAM(s) SubCutaneous every 24 hours  levETIRAcetam  IVPB 500 milliGRAM(s) IV Intermittent every 12 hours  lithium 900 milliGRAM(s) Oral at bedtime  lurasidone 40 milliGRAM(s) Oral daily  sertraline Concentrate 75 milliGRAM(s) Oral daily    MEDICATIONS  (PRN):  acetaminophen     Tablet .. 650 milliGRAM(s) Oral every 6 hours PRN Temp greater or equal to 38C (100.4F), Mild Pain (1 - 3)  aluminum hydroxide/magnesium hydroxide/simethicone Suspension 30 milliLiter(s) Oral every 4 hours PRN Dyspepsia  melatonin 3 milliGRAM(s) Oral at bedtime PRN Insomnia  ondansetron Injectable 4 milliGRAM(s) IV Push every 8 hours PRN Nausea and/or Vomiting      Allergies    No Known Allergies    Intolerances        SOCIAL HISTORY:  no tob,   (+)  alcohol 1 drink daily  no drugs    FAMILY HISTORY:  no seizuresin esther degree relatives      ROS: 14 point ROS negative other than what is present in HPI or below    Vital Signs Last 24 Hrs  T(C): 37.1 (26 Nov 2023 11:35), Max: 37.1 (26 Nov 2023 08:05)  T(F): 98.8 (26 Nov 2023 11:35), Max: 98.8 (26 Nov 2023 08:05)  HR: 68 (26 Nov 2023 11:35) (65 - 77)  BP: 146/84 (26 Nov 2023 11:35) (135/87 - 147/87)  BP(mean): --  RR: 18 (26 Nov 2023 11:35) (18 - 20)  SpO2: 97% (26 Nov 2023 11:35) (97% - 99%)    Parameters below as of 26 Nov 2023 11:35  Patient On (Oxygen Delivery Method): room air      General: NAD    Detailed Neurologic Exam:    Mental status: The patient is awake and alert and has normal attention span.  The patient is fully oriented in 3 spheres. The patient is oriented to current events. The patient is able to name objects, follow commands, repeat sentences.    Cranial nerves: Pupils equal and react symmetrically to light. There is no visual field deficit to confrontation. Extraocular motion is full with no nystagmus. There is no ptosis. Facial sensation is intact. Facial musculature is symmetric. Palate elevates symmetrically. Tongue is midline.    Motor: There is normal bulk and tone.  There is no tremor.  Strength is 5/5 in the right arm and leg.   Strength is 5/5 in the left arm and leg.    Sensation: Intact to light touch and pin in 4 extremities    Cerebellar: There is no dysmetria on finger to nose testing.    Gait : deferred    LABS:                         13.4   10.58 )-----------( 236      ( 26 Nov 2023 07:12 )             40.7       11-26    144  |  110<H>  |  11.8  ----------------------------<  114<H>  4.2   |  24.0  |  1.07    Ca    9.6      26 Nov 2023 07:12  Phos  2.8     11-26  Mg     1.8     11-26    TPro  6.5<L>  /  Alb  4.1  /  TBili  0.2<L>  /  DBili  x   /  AST  17  /  ALT  15  /  AlkPhos  82  11-26      ** PRELIMINARY EEG reviewed until  12:38    - Mild intermittent generalized nonspecific cerebral dysfunction /slowing.   - No seizures recorded so far.      Final report to be produced after completion of the study tomorrow morning.      RADIOLOGY & ADDITIONAL STUDIES (independently reviewed unless otherwise noted):  CT Head No Cont (11.26.23 @ 00:26)   IMPRESSION:    CT HEAD: No acute intracranial hemorrhage, mass effect, or osseous   fracture.

## 2023-11-26 NOTE — ED ADULT NURSE NOTE - OBJECTIVE STATEMENT
assumed care of pt from ambulance, pt AAOX4, resp. even and unlabored on RA, pt on CM/, pt endorses she seized x3 at home prior to coming in, pt states she was out to dinner and it happened, PMH of seizures but no meds prescribed, pt denies chest pain/SOB/headache/dizziness/vision changes/recent illness/fever/chills/abd. pain/N/V/D, pt denies medication changes/etoh

## 2023-11-26 NOTE — H&P ADULT - NSHPLABSRESULTS_GEN_ALL_CORE
CTH/CT Neck/CT Maxillofacial WO IMPRESSION:  CT HEAD: No acute intracranial hemorrhage, mass effect, or osseous   fracture.  CT MAXILLOFACIAL BONES: No acute maxillofacial bone or mandibular   fracture.  CT CERVICAL SPINE: No acute cervical spine fracture or traumatic   malalignment.

## 2023-11-26 NOTE — ED ADULT NURSE REASSESSMENT NOTE - NS ED NURSE REASSESS COMMENT FT1
pt actively seizing in bed, pt turned to her side, suction setup at the bedside and ready for use, MD Irvin called to bedside, seizure lasting approx. 1.5minutes, pt NS on CM,  @ 100%, administered zofran per MD order, pt TBA to tele for seizure precautions, safety maintained

## 2023-11-27 LAB
CULTURE RESULTS: SIGNIFICANT CHANGE UP
CULTURE RESULTS: SIGNIFICANT CHANGE UP
SPECIMEN SOURCE: SIGNIFICANT CHANGE UP
SPECIMEN SOURCE: SIGNIFICANT CHANGE UP

## 2023-11-27 PROCEDURE — 99232 SBSQ HOSP IP/OBS MODERATE 35: CPT

## 2023-11-27 PROCEDURE — 95720 EEG PHY/QHP EA INCR W/VEEG: CPT

## 2023-11-27 RX ADMIN — SERTRALINE 75 MILLIGRAM(S): 25 TABLET, FILM COATED ORAL at 11:05

## 2023-11-27 RX ADMIN — LURASIDONE HYDROCHLORIDE 40 MILLIGRAM(S): 40 TABLET ORAL at 11:04

## 2023-11-27 RX ADMIN — LITHIUM CARBONATE 900 MILLIGRAM(S): 300 TABLET, EXTENDED RELEASE ORAL at 22:04

## 2023-11-27 RX ADMIN — AMLODIPINE BESYLATE 5 MILLIGRAM(S): 2.5 TABLET ORAL at 05:30

## 2023-11-27 NOTE — EEG REPORT - NS EEG TEXT BOX
[facility] EPILEPSY CENTER  EPILEPSY MONITORING UNIT REPORT    [facility]: [address], [place] [address], Phone [phone]  [place] Office: [address], [address], [place] [address] Phone [phone]    [facility]: [address] [place] [address], Phone [phone]  [place] Office: [address], [place] [address], Phone [phone]    Patient Name: Daxa Chavis    Age: 48 year, : 1975  MRN: [id number], Kelly: 6T    Physician Ordering Inpatient EEG: Rey Clifton  Referral Source to EMU: non-elective admission – ED    EMU Study Started: 09:52 on 23    EMU Study Ended: pending discharge    -----------------------------------------------------------------------------------------------  STUDY INFORMATION:    EEG RECORDING TECHNIQUE:  The patient underwent continuous Video-EEG monitoring, using Telemetry System hardware on the XLTek Digital System. EEG and video data were stored on a computer hard drive with important events saved in digital archive files. The material was reviewed by a physician (electroencephalographer / epileptologist) on a daily basis. Yony and seizure detection algorithms were utilized and reviewed. An EEG Technician attended to the patient, and was available throughout daytime work hours.  The epilepsy center neurologist was available in person or on call 24-hours per day.    EEG PLACEMENT AND LABELING OF ELECTRODES:  The EEG was performed utilizing 20 channel referential EEG connections (coronal over temporal over parasagittal montage) using all standard 10-20 electrode placements with EKG, with additional electrodes placed in the inferior temporal region using the modified 10-10 montage electrode placements for elective admissions, or if deemed necessary. Recording was at a sampling rate of 256 samples per second per channel. Time synchronized digital video recording was done simultaneously with EEG recording. A low light infrared camera was used for low light recording.     -----------------------------------------------------------------------------------------------  HISTORY:  48F with PMHX Bipolar Disorder, MDD, Anxiety, Hyperparathyroidism, HTN BIBEMS to [facility] ER for witnessed seizure-like activity x3 episodes. Family at bedside stating hx prior seizure years ago during pregnancy. Toda had 3 seizures lasting 15-30 seconds per episode  by 15 minutes. Self-aborted. Pt had additional episode witnessed in ED which also self-resolved. +Tongue Laceration noted. No hx withdrawal seizures before. Has not seen a neurologist. No signs/Sx of acute ETOH withdrawal syndrome during admission. +ETOH Use Disorder. Denies IVDA, NKDA    HOME ANTISEIZURE MEDICATION AND DEVICE  No ASM (takes lithium, amlodipine, Latuda, sertraline)    -----------------------------------------------------------------------------------------------  INTERPRETATION:    DAY 1	START DATE/TIME: 2023 09:51  	END DATE/TIME: 2023  08:00  	DURATION: 21 hr 33 min    DAILY EEG VISUAL ANALYSIS  Findings: The background was continuous and reactive. During wakefulness, the posterior dominant rhythm consisted of symmetric, well-modulated 9 Hz activity, with amplitude to 30 uV, that attenuated to eye opening.     BACKGROUND SLOWING:  There is intermittent generalized rhythmical delta activity (GRDA)    FOCAL SLOWING:   None were present.    SLEEP BACKGROUND:  Drowsiness was characterized by fragmentation, attenuation, and slowing of the background activity.    Sleep was characterized by the presence of vertex waves, symmetric sleep spindles and K-complexes.    ACTIVATION  Hyperventilation resulted in increased background slowing diffusely but did not activate and focal or paroxysmal abnormalities.   Photic stimulation was not performed.     OTHER NON-EPILEPTIFORM FINDINGS:  None were present.    INTERICTAL EPILEPTIFORM ACTIVITY:   There were occasional sharply contoured waveforms in the right posterior quadrant that were not sufficiently distinct to characterize as abnormal.     EVENTS:  No events or seizures recorded.    ARTIFACTS:  Intermittent myogenic and movement artifacts were noted.    ECG:  The heart rate on single channel ECG was predominantly between 60-70 BPM.    ASM:  none  -----------------------------------------------------------------------------------------------  EEG SUMMARY:  Abnormal EEG in the awake, drowsy and asleep states.  1.	Generalized rhythmical delta activity (GRDA) intermittently    -----------------------------------------------------------------------------------------------  IMPRESSION/CLINICAL CORRELATE:  23 – 23: no event or seizure    Interictal findings suggest  Mild diffuse cerebral dysfunction, not specific for etiology.     -----------------------------------------------------------------------------------------------  Kole Davison MD  , Epilepsy/EMU – [facility]

## 2023-11-27 NOTE — EEG REPORT - NS EEG TEXT BOX
"Based on your risks, I have prescribed an antiviral medication that may help to reduce your risk of hospitalization due to COVID-19.  This medication is called \"Paxlovid\" and is being prescribed to:    [facility] [phone]  [address], [address]  [place] [address]    Hours:  Mon-Fri: 7:00a - 7:00p    Drive-thru services available..     Please call the pharmacy before going to verify that they have the medication on hand.  If they do not, they can tell you which pharmacy you may go to.      There are multiple potential drug interactions with Paxlovid. Your other medications may need to be adjusted or held in order to safely take Paxlovid. You can request to talk with the pharmacist about other potential drug interactions. Please have an updated list of medications you are taking.    " CORINNA BUI [id number]     Study Date: 11/26/23 10:23 - 11/27/23 08:00  Duration: 21 hr 33 min  --------------------------------------------------------------------------------------------------  History:  CC/ HPI Patient is a 48y old  Female who presents with a chief complaint of Recurrent Seizures (26 Nov 2023 13:04)    MEDICATIONS  (STANDING):  amLODIPine   Tablet 5 milliGRAM(s) Oral daily  enoxaparin Injectable 40 milliGRAM(s) SubCutaneous every 24 hours  lithium 900 milliGRAM(s) Oral at bedtime  lurasidone 40 milliGRAM(s) Oral daily  sertraline Concentrate 75 milliGRAM(s) Oral daily    --------------------------------------------------------------------------------------------------  Study Interpretation:    [[[Abbreviation Key:  PDR=alpha rhythm/posterior dominant rhythm. A-P=anterior posterior.  Amplitude: ‘very low’:<20; ‘low’:20-49; ‘medium’:; ‘high’:>150uV.  Persistence for periodic/rhythmic patterns (% of epoch) ‘rare’:<1%; ‘occasional’:1-10%; ‘frequent’:10-50%; ‘abundant’:50-90%; ‘continuous’:>90%.  Persistence for sporadic discharges: ‘rare’:<1/hr; ‘occasional’:1/min-1/hr; ‘frequent’:>1/min; ‘abundant’:>1/10 sec.  RPP=rhythmic and periodic patterns; GRDA=generalized rhythmic delta activity; FIRDA=frontal intermittent GRDA; LRDA=lateralized rhythmic delta activity; TIRDA=temporal intermittent rhythmic delta activity;  LPD=PLED=lateralized periodic discharges; GPD=generalized periodic discharges; BIPDs =bilateral independent periodic discharges; Mf=multifocal; SIRPDs=stimulus induced rhythmic, periodic, or ictal appearing discharges; BIRDs=brief potentially ictal rhythmic discharges >4 Hz, lasting .5-10s; PFA (paroxysmal bursts >13 Hz or =8 Hz <10s).  Modifiers: +F=with fast component; +S=with spike component; +R=with rhythmic component.  S-B=burst suppression pattern.  Max=maximal. N1-drowsy; N2-stage II sleep; N3-slow wave sleep. SSS/BETS=small sharp spikes/benign epileptiform transients of sleep. HV=hyperventilation; PS=photic stimulation]]]    Daily EEG Visual Analysis    FINDINGS:      Background:  Continuity: Continuous  Symmetry: Symmetric  Posterior dominant rhythm (PDR): 9-9.5 Hz, reactive to eye closure. Symmetric low-amplitude frontal beta in wakefulness.  State Change: Present  Voltage: Normal  Anterior-Posterior Gradient: Present  Other background findings: None  Breach: Absent    Background Slowing:  Generalized slowing: None  Focal slowing: Occasional bilateral independent frontotemporal focal polymorphic delta slowing in drowsiness    State Changes:   Drowsiness is characterized by fragmentation, attenuation, and slowing of the background activity, with the appearance of diffuse semi-rhythmic delta activity.  Stage 2 sleep is characterized by symmetric K complexes and sleep spindles.   Slow-wave sleep is characterized by diffuse delta activity.    Interictal Findings:  None    Electrographic and Electroclinical seizures:  None    Other Clinical Events:  None    Activation Procedures:   Hyperventilation is performed and does not elicit any abnormalities.    Photic stimulation is performed and does not elicit any abnormalities.      Artifacts:  Intermittent myogenic and movement artifacts are present.    EKG:  Single-lead EKG shows regular rhythm.    EEG Classification / Summary:  Abnormal EEG in the awake, drowsy, and asleep states.   Occasional bilateral independent frontotemporal focal slowing in drowsiness.  No epileptiform abnormalities are captured.     Clinical Impression:  Bilateral frontotemporal focal cerebral dysfunction can be structural or functional in etiology.           -------------------------------------------------------------------------------------------------------  [facility] EEG Reading Room Ph#: [phone]  Epilepsy Answering Service after 5PM and before 8:30AM: Ph#: [phone]    Dedra Rivas MD  Attending Physician, [facility]

## 2023-11-28 ENCOUNTER — TRANSCRIPTION ENCOUNTER (OUTPATIENT)
Age: 48
End: 2023-11-28

## 2023-11-28 VITALS
DIASTOLIC BLOOD PRESSURE: 89 MMHG | TEMPERATURE: 98 F | OXYGEN SATURATION: 95 % | SYSTOLIC BLOOD PRESSURE: 135 MMHG | HEART RATE: 67 BPM | RESPIRATION RATE: 18 BRPM

## 2023-11-28 PROCEDURE — 70450 CT HEAD/BRAIN W/O DYE: CPT | Mod: MA

## 2023-11-28 PROCEDURE — 83605 ASSAY OF LACTIC ACID: CPT

## 2023-11-28 PROCEDURE — 70486 CT MAXILLOFACIAL W/O DYE: CPT | Mod: MA

## 2023-11-28 PROCEDURE — 95700 EEG CONT REC W/VID EEG TECH: CPT

## 2023-11-28 PROCEDURE — 80053 COMPREHEN METABOLIC PANEL: CPT

## 2023-11-28 PROCEDURE — 81001 URINALYSIS AUTO W/SCOPE: CPT

## 2023-11-28 PROCEDURE — 36415 COLL VENOUS BLD VENIPUNCTURE: CPT

## 2023-11-28 PROCEDURE — 99285 EMERGENCY DEPT VISIT HI MDM: CPT

## 2023-11-28 PROCEDURE — 72125 CT NECK SPINE W/O DYE: CPT | Mod: MA

## 2023-11-28 PROCEDURE — 85025 COMPLETE CBC W/AUTO DIFF WBC: CPT

## 2023-11-28 PROCEDURE — 80178 ASSAY OF LITHIUM: CPT

## 2023-11-28 PROCEDURE — 84702 CHORIONIC GONADOTROPIN TEST: CPT

## 2023-11-28 PROCEDURE — 95720 EEG PHY/QHP EA INCR W/VEEG: CPT

## 2023-11-28 PROCEDURE — 95714 VEEG EA 12-26 HR UNMNTR: CPT

## 2023-11-28 PROCEDURE — 84100 ASSAY OF PHOSPHORUS: CPT

## 2023-11-28 PROCEDURE — C9254: CPT

## 2023-11-28 PROCEDURE — 12013 RPR F/E/E/N/L/M 2.6-5.0 CM: CPT

## 2023-11-28 PROCEDURE — 87086 URINE CULTURE/COLONY COUNT: CPT

## 2023-11-28 PROCEDURE — 99239 HOSP IP/OBS DSCHRG MGMT >30: CPT

## 2023-11-28 PROCEDURE — 71045 X-RAY EXAM CHEST 1 VIEW: CPT

## 2023-11-28 PROCEDURE — 99232 SBSQ HOSP IP/OBS MODERATE 35: CPT

## 2023-11-28 PROCEDURE — 80307 DRUG TEST PRSMV CHEM ANLYZR: CPT

## 2023-11-28 PROCEDURE — 93005 ELECTROCARDIOGRAM TRACING: CPT

## 2023-11-28 PROCEDURE — 85027 COMPLETE CBC AUTOMATED: CPT

## 2023-11-28 PROCEDURE — 82962 GLUCOSE BLOOD TEST: CPT

## 2023-11-28 PROCEDURE — 83735 ASSAY OF MAGNESIUM: CPT

## 2023-11-28 RX ORDER — LACOSAMIDE 50 MG/1
200 TABLET ORAL ONCE
Refills: 0 | Status: DISCONTINUED | OUTPATIENT
Start: 2023-11-28 | End: 2023-11-28

## 2023-11-28 RX ORDER — LACOSAMIDE 50 MG/1
1 TABLET ORAL
Qty: 60 | Refills: 0
Start: 2023-11-28 | End: 2023-12-27

## 2023-11-28 RX ADMIN — AMLODIPINE BESYLATE 5 MILLIGRAM(S): 2.5 TABLET ORAL at 06:10

## 2023-11-28 RX ADMIN — LURASIDONE HYDROCHLORIDE 40 MILLIGRAM(S): 40 TABLET ORAL at 11:40

## 2023-11-28 RX ADMIN — SERTRALINE 75 MILLIGRAM(S): 25 TABLET, FILM COATED ORAL at 11:40

## 2023-11-28 RX ADMIN — LACOSAMIDE 200 MILLIGRAM(S): 50 TABLET ORAL at 15:01

## 2023-11-28 NOTE — DISCHARGE NOTE NURSING/CASE MANAGEMENT/SOCIAL WORK - PATIENT PORTAL LINK FT
You can access the FollowMyHealth Patient Portal offered by [facility] by registering at the following website: [email or web address]. By joining [employer]’s FollowMyHealth portal, you will also be able to view your health information using other applications (apps) compatible with our system.

## 2023-11-28 NOTE — DISCHARGE NOTE PROVIDER - NSDCMRMEDTOKEN_GEN_ALL_CORE_FT
amLODIPine 5 mg oral tablet: 1 tab(s) orally once a day  lacosamide 50 mg oral tablet: 1 tab(s) orally 2 times a day start tonight 11/28/23 MDD: 2 tabs  Latuda: 40 milligram(s) orally once a day  lithium 300 mg oral tablet: 3 tab(s) orally once a day (at bedtime)  sertraline: 75 milligram(s) orally once a day

## 2023-11-28 NOTE — DISCHARGE NOTE PROVIDER - ATTENDING DISCHARGE PHYSICAL EXAMINATION:
Vital Signs Last 24 Hrs  T(C): 36.9 (28 Nov 2023 08:07), Max: 37.3 (27 Nov 2023 19:45)  T(F): 98.5 (28 Nov 2023 08:07), Max: 99.1 (27 Nov 2023 19:45)  HR: 67 (28 Nov 2023 08:07) (62 - 67)  BP: 135/89 (28 Nov 2023 08:07) (129/83 - 151/88)  BP(mean): --  RR: 18 (28 Nov 2023 08:07) (18 - 19)  SpO2: 95% (28 Nov 2023 08:07) (95% - 97%)    Parameters below as of 28 Nov 2023 08:07  Patient On (Oxygen Delivery Method): room air    CONSTITUTIONAL: NAD, sitting up in bed  RESPIRATORY: Normal respiratory effort; lungs are clear to auscultation bilaterally  CARDIOVASCULAR: Regular rate and rhythm, normal S1 and S2   ABDOMEN: Nontender to palpation, normoactive bowel sounds  MUSCULOSKELETAL:  No clubbing or cyanosis of digits   PSYCH: A+O to person, place, and time; affect appropriate  NEUROLOGY: No gross sensory or motor deficits

## 2023-11-28 NOTE — DISCHARGE NOTE PROVIDER - NSDCFUSCHEDAPPT_GEN_ALL_CORE_FT
Tex Garcia  [facility]  [facility]  Scheduled Appointment: 11/29/2023    Hector Thornton  [facility] Physician [facility] [address]  Scheduled Appointment: 12/05/2023    Tex Garcia  [place] Physician [facility]  GEN[facility]  Scheduled Appointment: 12/05/2023    Tex Garcia  [facility]  [facility] Amb Surg [facility]  Scheduled Appointment: 12/05/2023    [facility] [address]  Scheduled Appointment: 12/13/2023     rectal bleeding

## 2023-11-28 NOTE — PROGRESS NOTE ADULT - ASSESSMENT
48F with PMHX Bipolar Disorder, MDD, Anxiety, Hyperparathyroidism, HTN BIBEMS to [facility] ER for witnessed seizure-like activity x3 episodes admitted for recurrent seizures.    Recurrent Seizures, Tongue Laceration   - c/w EEG for another 24 hrs per Neuro   - Neurochecks q4  - Seizure Precautions  - CTH/Neck/Maxillofacial WO negative  - Tongue lac sutured in ED  - Keppra on hold   - Neuro consult appreciated     Hyperparathyroidism  - Cinacalcet on hold now x2 weeks for planned outpatient Parathyroidectomy    Bipolar Disorder, MDD  - Zoloft 75mg q24  - Lithium 900mg qHS  - Latuda 40mg q24    HTN  - Amlodipine 5mg q24  - Monitor VS    VTE PPX  - LMWH 40mg q24/SCDs  
The patient is a 48y Female who is followed by neurology because of seizure.  She has a loss of responsiveness with generalized tonic contrac tion and incontinence.  They usually happen once per year and often in setting of migraine.    Seizure  Continuous video EEG with EMU monitoring to better capture and classify seizure activity to choose most appropriate anti-seizure for her.  Seizure precautions  EEG did not show seizure, d/c EEG   however given her history I am concerned for epilepsy  I will load vimpat 200 mg IV the start vimpat 50 mg po bid at home, titer if needed  a  given loss of responsiveness during these episode she cannot operate a motor vehicle until cleared by neurology/epilepsy, will need stability on anti-seizure medication for 6 months, patient was advised of this    Neuro stable for discharge when medically stable    Thank you for allowing me to participate in the care of your patient    Jozef Walls MD, PhD   [id number]  
The patient is a 48y Female who is followed by neurology because of seizure.  She has a loss of responsiveness with generalized tonic contrac tion and incontinence.  They usually happen once per year and often in setting of migraine.    Seizure  Continuous video EEG with EMU monitoring to better capture and classify seizure activity to choose most appropriate anti-seizure for her.  Seizure precautions  will d/c keppra on EEG, can restart keppra or alternate anti-seizure medication if EEG shows seizure activity  anticipate additional 24 hours of recording.  if no seizures may lean towards medication as she reported today that she has these events stop in past when she was on lamictal for BPD (which was stopped b/c it did not help her BPD in past)    given loss of responsiveness during these episode she cannot operate a motor vehicle until cleared by neurology/epilepsy, will need stability on anti-seizure medication    will follow with you, anticipate being able to clear for d/c in 24 hours if stable    Jozef Walls MD PhD   [id number]

## 2023-11-28 NOTE — DISCHARGE NOTE PROVIDER - HOSPITAL COURSE
48F with PMHX Bipolar Disorder, MDD, Anxiety, Hyperparathyroidism, HTN BIBEMS to [facility] ER for witnessed seizure-like activity x3 episodes admitted for recurrent seizures.  Recurrent Seizures, Tongue Laceration - CTH/ Neck/ Maxillofacial WO negative. s/p EEG showing Occasional bilateral independent frontotemporal focal slowing. Neuro consulted and recommended Vimpat on DC. Pt advised not to drive unless cleared by Neurology.

## 2023-11-28 NOTE — PROGRESS NOTE ADULT - SUBJECTIVE AND OBJECTIVE BOX
[facility]                                     Neurology at [place]                                 Kavita Che & Vinod                                  [address]                                        [place], [place], [address]                                             [phone]    CC: seizures  HPI:  The patient is a 48y Female who presented with 3 seizures at home and one after coming to the ED.  she says that since college she has had seizures, on average once per year.  they consist of her tensing up and staring with unresponsiveness.  they last about 30 seconds.  She can only remember when she comes out of it.  Yesterday she had urinary incontinence with this and she fell and hit her head on a chair and suffered an tongue laceration. She has one drink daily and has never had alcohol withdrawal seizures.  Neurology is asked to evaluate.    Interval history: no events overnight,     Review of systems (neurology): Denies headache or dizziness. Denies weakness/numbness.  Denies speech/language deficits. Denies diplopia/blurred vision.  Denies confusion    MEDICATIONS  (STANDING):  amLODIPine   Tablet 5 milliGRAM(s) Oral daily  enoxaparin Injectable 40 milliGRAM(s) SubCutaneous every 24 hours  lacosamide Injectable 200 milliGRAM(s) IV Push once  lithium 900 milliGRAM(s) Oral at bedtime  lurasidone 40 milliGRAM(s) Oral daily  sertraline Concentrate 75 milliGRAM(s) Oral daily    MEDICATIONS  (PRN):  acetaminophen     Tablet .. 650 milliGRAM(s) Oral every 6 hours PRN Temp greater or equal to 38C (100.4F), Mild Pain (1 - 3)  aluminum hydroxide/magnesium hydroxide/simethicone Suspension 30 milliLiter(s) Oral every 4 hours PRN Dyspepsia  melatonin 3 milliGRAM(s) Oral at bedtime PRN Insomnia  ondansetron Injectable 4 milliGRAM(s) IV Push every 8 hours PRN Nausea and/or Vomiting      Vital Signs Last 24 Hrs  T(C): 36.9 (28 Nov 2023 08:07), Max: 37.3 (27 Nov 2023 19:45)  T(F): 98.5 (28 Nov 2023 08:07), Max: 99.1 (27 Nov 2023 19:45)  HR: 67 (28 Nov 2023 08:07) (62 - 67)  BP: 135/89 (28 Nov 2023 08:07) (129/83 - 151/88)  BP(mean): --  RR: 18 (28 Nov 2023 08:07) (18 - 19)  SpO2: 95% (28 Nov 2023 08:07) (95% - 97%)    Parameters below as of 28 Nov 2023 08:07  Patient On (Oxygen Delivery Method): room air        General: NAD    Detailed Neurologic Exam:    Mental status: The patient is awake and alert and has normal attention span.  The patient is fully oriented in 3 spheres. The patient is oriented to current events. The patient is able to name objects, follow commands, repeat sentences.    Cranial nerves: Pupils equal and react symmetrically to light. There is no visual field deficit to confrontation. Extraocular motion is full with no nystagmus. There is no ptosis. Facial sensation is intact. Facial musculature is symmetric. Palate elevates symmetrically. Tongue is midline.    Motor: There is normal bulk and tone.  There is no tremor.  Strength is 5/5 in the right arm and leg.   Strength is 5/5 in the left arm and leg.    Sensation: Intact to light touch and pin in 4 extremities    Cerebellar: There is no dysmetria on finger to nose testing.    Gait : deferred    LABS:     EEG Classification / Summary 11/27-11/28/23:  Abnormal EEG in the awake, drowsy, and asleep states.   Occasional bilateral independent frontotemporal focal slowing.  No epileptiform abnormalities are captured.     Clinical Impression:  Bilateral frontotemporal focal cerebral dysfunction can be structural or functional in etiology.   -----------------------------------------------------------------------------------------------  EEG SUMMARY 11/26-11/27/23:  Abnormal EEG in the awake, drowsy and asleep states.  1.	Generalized rhythmical delta activity (GRDA) intermittently    -----------------------------------------------------------------------------------------------  IMPRESSION/CLINICAL CORRELATE:  11/26/23 – 11/27/23: no event or seizure    Interictal findings suggest  Mild diffuse cerebral dysfunction, not specific for etiology.         RADIOLOGY & ADDITIONAL STUDIES (independently reviewed unless otherwise noted):  CT Head No Cont (11.26.23 @ 00:26)   IMPRESSION:    CT HEAD: No acute intracranial hemorrhage, mass effect, or osseous   fracture.    
                             [facility]                                     Neurology at [place]                                 Kavita Che, & Vinod                                  [address]                                        [place], [place], [address]                                             [phone]    CC: seizures  HPI:  The patient is a 48y Female who presented with 3 seizures at home and one after coming to the ED.  she says that since college she has had seizures, on average once per year.  they consist of her tensing up and staring with unresponsiveness.  they last about 30 seconds.  She can only remember when she comes out of it.  Yesterday she had urinary incontinence with this and she fell and hit her head on a chair and suffered an tongue laceration. She has one drink daily and has never had alcohol withdrawal seizures.  Neurology is asked to evaluate.    Interval history: no events overnight, however today she told me that these events "disappeared" when she had been on lamictal for BPD    Review of systems (neurology): Denies headache or dizziness. Denies weakness/numbness.  Denies speech/language deficits. Denies diplopia/blurred vision.  Denies confusion    MEDICATIONS  (STANDING):  amLODIPine   Tablet 5 milliGRAM(s) Oral daily  enoxaparin Injectable 40 milliGRAM(s) SubCutaneous every 24 hours  lithium 900 milliGRAM(s) Oral at bedtime  lurasidone 40 milliGRAM(s) Oral daily  sertraline Concentrate 75 milliGRAM(s) Oral daily    MEDICATIONS  (PRN):  acetaminophen     Tablet .. 650 milliGRAM(s) Oral every 6 hours PRN Temp greater or equal to 38C (100.4F), Mild Pain (1 - 3)  aluminum hydroxide/magnesium hydroxide/simethicone Suspension 30 milliLiter(s) Oral every 4 hours PRN Dyspepsia  melatonin 3 milliGRAM(s) Oral at bedtime PRN Insomnia  ondansetron Injectable 4 milliGRAM(s) IV Push every 8 hours PRN Nausea and/or Vomiting      Vital Signs Last 24 Hrs  T(C): 36.8 (27 Nov 2023 07:55), Max: 37.1 (26 Nov 2023 11:35)  T(F): 98.3 (27 Nov 2023 07:55), Max: 98.8 (26 Nov 2023 11:35)  HR: 66 (27 Nov 2023 07:55) (61 - 69)  BP: 142/88 (27 Nov 2023 07:55) (129/76 - 146/84)  BP(mean): --  RR: 18 (27 Nov 2023 07:55) (18 - 18)  SpO2: 97% (27 Nov 2023 07:55) (95% - 97%)    Parameters below as of 27 Nov 2023 07:55  Patient On (Oxygen Delivery Method): room air    General: NAD    Detailed Neurologic Exam:    Mental status: The patient is awake and alert and has normal attention span.  The patient is fully oriented in 3 spheres. The patient is oriented to current events. The patient is able to name objects, follow commands, repeat sentences.    Cranial nerves: Pupils equal and react symmetrically to light. There is no visual field deficit to confrontation. Extraocular motion is full with no nystagmus. There is no ptosis. Facial sensation is intact. Facial musculature is symmetric. Palate elevates symmetrically. Tongue is midline.    Motor: There is normal bulk and tone.  There is no tremor.  Strength is 5/5 in the right arm and leg.   Strength is 5/5 in the left arm and leg.    Sensation: Intact to light touch and pin in 4 extremities    Cerebellar: There is no dysmetria on finger to nose testing.    Gait : deferred    LABS:                                    13.4   10.58 )-----------( 236      ( 26 Nov 2023 07:12 )             40.7     11-26    144  |  110<H>  |  11.8  ----------------------------<  114<H>  4.2   |  24.0  |  1.07    Ca    9.6      26 Nov 2023 07:12  Phos  2.8     11-26  Mg     1.8     11-26    TPro  6.5<L>  /  Alb  4.1  /  TBili  0.2<L>  /  DBili  x   /  AST  17  /  ALT  15  /  AlkPhos  82  11-26    LIVER FUNCTIONS - ( 26 Nov 2023 07:12 )  Alb: 4.1 g/dL / Pro: 6.5 g/dL / ALK PHOS: 82 U/L / ALT: 15 U/L / AST: 17 U/L / GGT: x             -----------------------------------------------------------------------------------------------  EEG SUMMARY 11/26-11/27/23:  Abnormal EEG in the awake, drowsy and asleep states.  1.	Generalized rhythmical delta activity (GRDA) intermittently    -----------------------------------------------------------------------------------------------  IMPRESSION/CLINICAL CORRELATE:  11/26/23 – 11/27/23: no event or seizure    Interictal findings suggest  Mild diffuse cerebral dysfunction, not specific for etiology.         RADIOLOGY & ADDITIONAL STUDIES (independently reviewed unless otherwise noted):  CT Head No Cont (11.26.23 @ 00:26)   IMPRESSION:    CT HEAD: No acute intracranial hemorrhage, mass effect, or osseous   fracture.    
[facility] Division of Hospital Medicine    Chief Complaint:  Recurrent Seizures    SUBJECTIVE / OVERNIGHT EVENTS:  Pt reports no complaints   EEG ongoing     Patient denies chest pain, SOB, abd pain, N/V, fever, chills, dysuria or any other complaints. All remainder ROS negative.     MEDICATIONS  (STANDING):  amLODIPine   Tablet 5 milliGRAM(s) Oral daily  enoxaparin Injectable 40 milliGRAM(s) SubCutaneous every 24 hours  lithium 900 milliGRAM(s) Oral at bedtime  lurasidone 40 milliGRAM(s) Oral daily  sertraline Concentrate 75 milliGRAM(s) Oral daily    MEDICATIONS  (PRN):  acetaminophen     Tablet .. 650 milliGRAM(s) Oral every 6 hours PRN Temp greater or equal to 38C (100.4F), Mild Pain (1 - 3)  aluminum hydroxide/magnesium hydroxide/simethicone Suspension 30 milliLiter(s) Oral every 4 hours PRN Dyspepsia  melatonin 3 milliGRAM(s) Oral at bedtime PRN Insomnia  ondansetron Injectable 4 milliGRAM(s) IV Push every 8 hours PRN Nausea and/or Vomiting        I&O's Summary      PHYSICAL EXAM:  Vital Signs Last 24 Hrs  T(C): 36.8 (27 Nov 2023 15:51), Max: 37.3 (27 Nov 2023 11:34)  T(F): 98.3 (27 Nov 2023 15:51), Max: 99.1 (27 Nov 2023 11:34)  HR: 65 (27 Nov 2023 15:51) (61 - 69)  BP: 130/85 (27 Nov 2023 15:51) (125/85 - 142/88)  BP(mean): --  RR: 19 (27 Nov 2023 15:51) (18 - 19)  SpO2: 96% (27 Nov 2023 15:51) (94% - 97%)    Parameters below as of 27 Nov 2023 15:51  Patient On (Oxygen Delivery Method): room air        CONSTITUTIONAL: NAD, sitting up in bed  RESPIRATORY: Normal respiratory effort; lungs are clear to auscultation bilaterally  CARDIOVASCULAR: Regular rate and rhythm, normal S1 and S2   ABDOMEN: Nontender to palpation, normoactive bowel sounds  MUSCULOSKELETAL:  No clubbing or cyanosis of digits   PSYCH: A+O to person, place, and time; affect appropriate  NEUROLOGY: No gross sensory or motor deficits       LABS:                        13.4   10.58 )-----------( 236      ( 26 Nov 2023 07:12 )             40.7     11-26    144  |  110<H>  |  11.8  ----------------------------<  114<H>  4.2   |  24.0  |  1.07    Ca    9.6      26 Nov 2023 07:12  Phos  2.8     11-26  Mg     1.8     11-26    TPro  6.5<L>  /  Alb  4.1  /  TBili  0.2<L>  /  DBili  x   /  AST  17  /  ALT  15  /  AlkPhos  82  11-26          Urinalysis Basic - ( 26 Nov 2023 07:12 )    Color: x / Appearance: x / SG: x / pH: x  Gluc: 114 mg/dL / Ketone: x  / Bili: x / Urobili: x   Blood: x / Protein: x / Nitrite: x   Leuk Esterase: x / RBC: x / WBC x   Sq Epi: x / Non Sq Epi: x / Bacteria: x

## 2023-11-28 NOTE — DISCHARGE NOTE PROVIDER - NSDCCPCAREPLAN_GEN_ALL_CORE_FT
PRINCIPAL DISCHARGE DIAGNOSIS  Diagnosis: Seizure  Assessment and Plan of Treatment: Neuro consulted and recommended [place] on [place]. P  DO NOT drive unless cleared by Neurology.

## 2023-11-28 NOTE — DISCHARGE NOTE NURSING/CASE MANAGEMENT/SOCIAL WORK - NSDCPEFALRISK_GEN_ALL_CORE
For information on Fall & Injury Prevention, visit: [email or web address] OR  [email or web address] OR  https://www.cdc.gov/steadi/patient.html

## 2023-11-28 NOTE — EEG REPORT - NS EEG TEXT BOX
CORINNA BUI [id number]     Study Date: 11/27/23 08:00 - 11/28/23 08:00  Duration: 22 hr 39 min  --------------------------------------------------------------------------------------------------  History:  CC/ HPI Patient is a 48y old  Female who presents with a chief complaint of Recurrent Seizures (27 Nov 2023 16:56)    MEDICATIONS  (STANDING):  amLODIPine   Tablet 5 milliGRAM(s) Oral daily  enoxaparin Injectable 40 milliGRAM(s) SubCutaneous every 24 hours  lithium 900 milliGRAM(s) Oral at bedtime  lurasidone 40 milliGRAM(s) Oral daily  sertraline Concentrate 75 milliGRAM(s) Oral daily    --------------------------------------------------------------------------------------------------  Study Interpretation:    [[[Abbreviation Key:  PDR=alpha rhythm/posterior dominant rhythm. A-P=anterior posterior.  Amplitude: ‘very low’:<20; ‘low’:20-49; ‘medium’:; ‘high’:>150uV.  Persistence for periodic/rhythmic patterns (% of epoch) ‘rare’:<1%; ‘occasional’:1-10%; ‘frequent’:10-50%; ‘abundant’:50-90%; ‘continuous’:>90%.  Persistence for sporadic discharges: ‘rare’:<1/hr; ‘occasional’:1/min-1/hr; ‘frequent’:>1/min; ‘abundant’:>1/10 sec.  RPP=rhythmic and periodic patterns; GRDA=generalized rhythmic delta activity; FIRDA=frontal intermittent GRDA; LRDA=lateralized rhythmic delta activity; TIRDA=temporal intermittent rhythmic delta activity;  LPD=PLED=lateralized periodic discharges; GPD=generalized periodic discharges; BIPDs =bilateral independent periodic discharges; Mf=multifocal; SIRPDs=stimulus induced rhythmic, periodic, or ictal appearing discharges; BIRDs=brief potentially ictal rhythmic discharges >4 Hz, lasting .5-10s; PFA (paroxysmal bursts >13 Hz or =8 Hz <10s).  Modifiers: +F=with fast component; +S=with spike component; +R=with rhythmic component.  S-B=burst suppression pattern.  Max=maximal. N1-drowsy; N2-stage II sleep; N3-slow wave sleep. SSS/BETS=small sharp spikes/benign epileptiform transients of sleep. HV=hyperventilation; PS=photic stimulation]]]    Daily EEG Visual Analysis    FINDINGS:      Background:  Continuity: Continuous  Symmetry: Symmetric  Posterior dominant rhythm (PDR): 8.5-9 Hz, reactive to eye closure. Symmetric low-amplitude frontal beta in wakefulness.  State Change: Present  Voltage: Normal  Anterior-Posterior Gradient: Present  Other background findings: None  Breach: Absent    Background Slowing:  Generalized slowing: None  Focal slowing: Occasional bilateral independent frontotemporal focal polymorphic delta slowing    State Changes:   Drowsiness is characterized by fragmentation, attenuation, and slowing of the background activity.  Stage 2 sleep is characterized by symmetric K complexes and sleep spindles.     Interictal Findings:  None    Electrographic and Electroclinical seizures:  None    Other Clinical Events:  None    Activation Procedures:   Hyperventilation is performed and does not elicit any abnormalities.    Photic stimulation is performed and does not elicit any abnormalities.      Artifacts:  Intermittent myogenic and movement artifacts are present.    EKG:  Single-lead EKG shows regular rhythm.    EEG Classification / Summary:  Abnormal EEG in the awake, drowsy, and asleep states.   Occasional bilateral independent frontotemporal focal slowing.  No epileptiform abnormalities are captured.     Clinical Impression:  Bilateral frontotemporal focal cerebral dysfunction can be structural or functional in etiology.         -------------------------------------------------------------------------------------------------------  [facility] EEG Reading Room Ph#: [phone]  Epilepsy Answering Service after 5PM and before 8:30AM: Ph#: [phone]    Dedra Rivas MD  Attending Physician, [facility] [place]   CORINNA BUI [id number]     Study Date: 11/27/23 08:00 - 11/28/23 11:56  Duration: 26 hr 34 min  --------------------------------------------------------------------------------------------------  History:  CC/ HPI Patient is a 48y old  Female who presents with a chief complaint of Recurrent Seizures (27 Nov 2023 16:56)    MEDICATIONS  (STANDING):  amLODIPine   Tablet 5 milliGRAM(s) Oral daily  enoxaparin Injectable 40 milliGRAM(s) SubCutaneous every 24 hours  lithium 900 milliGRAM(s) Oral at bedtime  lurasidone 40 milliGRAM(s) Oral daily  sertraline Concentrate 75 milliGRAM(s) Oral daily    --------------------------------------------------------------------------------------------------  Study Interpretation:    [[[Abbreviation Key:  PDR=alpha rhythm/posterior dominant rhythm. A-P=anterior posterior.  Amplitude: ‘very low’:<20; ‘low’:20-49; ‘medium’:; ‘high’:>150uV.  Persistence for periodic/rhythmic patterns (% of epoch) ‘rare’:<1%; ‘occasional’:1-10%; ‘frequent’:10-50%; ‘abundant’:50-90%; ‘continuous’:>90%.  Persistence for sporadic discharges: ‘rare’:<1/hr; ‘occasional’:1/min-1/hr; ‘frequent’:>1/min; ‘abundant’:>1/10 sec.  RPP=rhythmic and periodic patterns; GRDA=generalized rhythmic delta activity; FIRDA=frontal intermittent GRDA; LRDA=lateralized rhythmic delta activity; TIRDA=temporal intermittent rhythmic delta activity;  LPD=PLED=lateralized periodic discharges; GPD=generalized periodic discharges; BIPDs =bilateral independent periodic discharges; Mf=multifocal; SIRPDs=stimulus induced rhythmic, periodic, or ictal appearing discharges; BIRDs=brief potentially ictal rhythmic discharges >4 Hz, lasting .5-10s; PFA (paroxysmal bursts >13 Hz or =8 Hz <10s).  Modifiers: +F=with fast component; +S=with spike component; +R=with rhythmic component.  S-B=burst suppression pattern.  Max=maximal. N1-drowsy; N2-stage II sleep; N3-slow wave sleep. SSS/BETS=small sharp spikes/benign epileptiform transients of sleep. HV=hyperventilation; PS=photic stimulation]]]    Daily EEG Visual Analysis    FINDINGS:      Background:  Continuity: Continuous  Symmetry: Symmetric  Posterior dominant rhythm (PDR): 8.5-9 Hz, reactive to eye closure. Symmetric low-amplitude frontal beta in wakefulness.  State Change: Present  Voltage: Normal  Anterior-Posterior Gradient: Present  Other background findings: None  Breach: Absent    Background Slowing:  Generalized slowing: None  Focal slowing: Occasional bilateral independent frontotemporal focal polymorphic delta slowing    State Changes:   Drowsiness is characterized by fragmentation, attenuation, and slowing of the background activity.  Stage 2 sleep is characterized by symmetric K complexes and sleep spindles.     Interictal Findings:  None    Electrographic and Electroclinical seizures:  None    Other Clinical Events:  None    Activation Procedures:   Hyperventilation is performed and does not elicit any abnormalities.    Photic stimulation is performed and does not elicit any abnormalities.      Artifacts:  Intermittent myogenic and movement artifacts are present.    EKG:  Single-lead EKG shows regular rhythm.    EEG Classification / Summary:  Abnormal EEG in the awake, drowsy, and asleep states.   Occasional bilateral independent frontotemporal focal slowing.  No epileptiform abnormalities are captured.     Clinical Impression:  Bilateral frontotemporal focal cerebral dysfunction can be structural or functional in etiology.         -------------------------------------------------------------------------------------------------------  [facility] EEG Reading Room Ph#: [phone]  Epilepsy Answering Service after 5PM and before 8:30AM: Ph#: [phone]    Dedra Rivas MD  Attending Physician, [facility] Epilepsy [place]

## 2023-11-28 NOTE — DISCHARGE NOTE PROVIDER - CARE PROVIDER_API CALL
Jozef Walls  Neurology  [address] [place] [address], [facility] [address]  [place] [address]  Phone: [phone]  Fax: [phone]  Follow Up Time:

## 2023-11-29 ENCOUNTER — OUTPATIENT (OUTPATIENT)
Dept: OUTPATIENT SERVICES | Facility: HOSPITAL | Age: 48
LOS: 1 days | End: 2023-11-29

## 2023-11-29 VITALS
OXYGEN SATURATION: 99 % | SYSTOLIC BLOOD PRESSURE: 126 MMHG | DIASTOLIC BLOOD PRESSURE: 88 MMHG | TEMPERATURE: 98 F | HEART RATE: 76 BPM | RESPIRATION RATE: 16 BRPM | WEIGHT: 188.94 LBS | HEIGHT: 65 IN

## 2023-11-29 DIAGNOSIS — F31.9 BIPOLAR DISORDER, UNSPECIFIED: ICD-10-CM

## 2023-11-29 DIAGNOSIS — E21.0 PRIMARY HYPERPARATHYROIDISM: ICD-10-CM

## 2023-11-29 DIAGNOSIS — G40.909 EPILEPSY, UNSPECIFIED, NOT INTRACTABLE, WITHOUT STATUS EPILEPTICUS: ICD-10-CM

## 2023-11-29 DIAGNOSIS — E21.3 HYPERPARATHYROIDISM, UNSPECIFIED: ICD-10-CM

## 2023-11-29 DIAGNOSIS — E05.90 THYROTOXICOSIS, UNSPECIFIED WITHOUT THYROTOXIC CRISIS OR STORM: ICD-10-CM

## 2023-11-29 DIAGNOSIS — Z98.890 OTHER SPECIFIED POSTPROCEDURAL STATES: Chronic | ICD-10-CM

## 2023-11-29 LAB
ALBUMIN SERPL ELPH-MCNC: 4.3 G/DL — SIGNIFICANT CHANGE UP (ref 3.3–5)
ALBUMIN SERPL ELPH-MCNC: 4.3 G/DL — SIGNIFICANT CHANGE UP (ref 3.3–5)
ALP SERPL-CCNC: 95 U/L — SIGNIFICANT CHANGE UP (ref 40–120)
ALP SERPL-CCNC: 95 U/L — SIGNIFICANT CHANGE UP (ref 40–120)
ALT FLD-CCNC: 19 U/L — SIGNIFICANT CHANGE UP (ref 4–33)
ALT FLD-CCNC: 19 U/L — SIGNIFICANT CHANGE UP (ref 4–33)
ANION GAP SERPL CALC-SCNC: 10 MMOL/L — SIGNIFICANT CHANGE UP (ref 7–14)
ANION GAP SERPL CALC-SCNC: 10 MMOL/L — SIGNIFICANT CHANGE UP (ref 7–14)
AST SERPL-CCNC: 17 U/L — SIGNIFICANT CHANGE UP (ref 4–32)
AST SERPL-CCNC: 17 U/L — SIGNIFICANT CHANGE UP (ref 4–32)
BILIRUB SERPL-MCNC: 0.3 MG/DL — SIGNIFICANT CHANGE UP (ref 0.2–1.2)
BILIRUB SERPL-MCNC: 0.3 MG/DL — SIGNIFICANT CHANGE UP (ref 0.2–1.2)
BLD GP AB SCN SERPL QL: NEGATIVE — SIGNIFICANT CHANGE UP
BLD GP AB SCN SERPL QL: NEGATIVE — SIGNIFICANT CHANGE UP
BUN SERPL-MCNC: 18 MG/DL — SIGNIFICANT CHANGE UP (ref 7–23)
BUN SERPL-MCNC: 18 MG/DL — SIGNIFICANT CHANGE UP (ref 7–23)
CALCIUM SERPL-MCNC: 11.2 MG/DL — HIGH (ref 8.4–10.5)
CALCIUM SERPL-MCNC: 11.2 MG/DL — HIGH (ref 8.4–10.5)
CHLORIDE SERPL-SCNC: 104 MMOL/L — SIGNIFICANT CHANGE UP (ref 98–107)
CHLORIDE SERPL-SCNC: 104 MMOL/L — SIGNIFICANT CHANGE UP (ref 98–107)
CO2 SERPL-SCNC: 24 MMOL/L — SIGNIFICANT CHANGE UP (ref 22–31)
CO2 SERPL-SCNC: 24 MMOL/L — SIGNIFICANT CHANGE UP (ref 22–31)
CREAT SERPL-MCNC: 1.34 MG/DL — HIGH (ref 0.5–1.3)
CREAT SERPL-MCNC: 1.34 MG/DL — HIGH (ref 0.5–1.3)
EGFR: 48 ML/MIN/1.73M2 — LOW
EGFR: 48 ML/MIN/1.73M2 — LOW
GLUCOSE SERPL-MCNC: 85 MG/DL — SIGNIFICANT CHANGE UP (ref 70–99)
GLUCOSE SERPL-MCNC: 85 MG/DL — SIGNIFICANT CHANGE UP (ref 70–99)
HCG SERPL-ACNC: 3.4 MIU/ML — SIGNIFICANT CHANGE UP
HCG SERPL-ACNC: 3.4 MIU/ML — SIGNIFICANT CHANGE UP
HCT VFR BLD CALC: 45.7 % — HIGH (ref 34.5–45)
HCT VFR BLD CALC: 45.7 % — HIGH (ref 34.5–45)
HGB BLD-MCNC: 14.8 G/DL — SIGNIFICANT CHANGE UP (ref 11.5–15.5)
HGB BLD-MCNC: 14.8 G/DL — SIGNIFICANT CHANGE UP (ref 11.5–15.5)
MCHC RBC-ENTMCNC: 31.6 PG — SIGNIFICANT CHANGE UP (ref 27–34)
MCHC RBC-ENTMCNC: 31.6 PG — SIGNIFICANT CHANGE UP (ref 27–34)
MCHC RBC-ENTMCNC: 32.4 GM/DL — SIGNIFICANT CHANGE UP (ref 32–36)
MCHC RBC-ENTMCNC: 32.4 GM/DL — SIGNIFICANT CHANGE UP (ref 32–36)
MCV RBC AUTO: 97.6 FL — SIGNIFICANT CHANGE UP (ref 80–100)
MCV RBC AUTO: 97.6 FL — SIGNIFICANT CHANGE UP (ref 80–100)
NRBC # BLD: 0 /100 WBCS — SIGNIFICANT CHANGE UP (ref 0–0)
NRBC # BLD: 0 /100 WBCS — SIGNIFICANT CHANGE UP (ref 0–0)
NRBC # FLD: 0 K/UL — SIGNIFICANT CHANGE UP (ref 0–0)
NRBC # FLD: 0 K/UL — SIGNIFICANT CHANGE UP (ref 0–0)
PLATELET # BLD AUTO: 238 K/UL — SIGNIFICANT CHANGE UP (ref 150–400)
PLATELET # BLD AUTO: 238 K/UL — SIGNIFICANT CHANGE UP (ref 150–400)
POTASSIUM SERPL-MCNC: 4.3 MMOL/L — SIGNIFICANT CHANGE UP (ref 3.5–5.3)
POTASSIUM SERPL-MCNC: 4.3 MMOL/L — SIGNIFICANT CHANGE UP (ref 3.5–5.3)
POTASSIUM SERPL-SCNC: 4.3 MMOL/L — SIGNIFICANT CHANGE UP (ref 3.5–5.3)
POTASSIUM SERPL-SCNC: 4.3 MMOL/L — SIGNIFICANT CHANGE UP (ref 3.5–5.3)
PROT SERPL-MCNC: 7.3 G/DL — SIGNIFICANT CHANGE UP (ref 6–8.3)
PROT SERPL-MCNC: 7.3 G/DL — SIGNIFICANT CHANGE UP (ref 6–8.3)
RBC # BLD: 4.68 M/UL — SIGNIFICANT CHANGE UP (ref 3.8–5.2)
RBC # BLD: 4.68 M/UL — SIGNIFICANT CHANGE UP (ref 3.8–5.2)
RBC # FLD: 11.8 % — SIGNIFICANT CHANGE UP (ref 10.3–14.5)
RBC # FLD: 11.8 % — SIGNIFICANT CHANGE UP (ref 10.3–14.5)
RH IG SCN BLD-IMP: POSITIVE — SIGNIFICANT CHANGE UP
SODIUM SERPL-SCNC: 138 MMOL/L — SIGNIFICANT CHANGE UP (ref 135–145)
SODIUM SERPL-SCNC: 138 MMOL/L — SIGNIFICANT CHANGE UP (ref 135–145)
WBC # BLD: 6.21 K/UL — SIGNIFICANT CHANGE UP (ref 3.8–10.5)
WBC # BLD: 6.21 K/UL — SIGNIFICANT CHANGE UP (ref 3.8–10.5)
WBC # FLD AUTO: 6.21 K/UL — SIGNIFICANT CHANGE UP (ref 3.8–10.5)
WBC # FLD AUTO: 6.21 K/UL — SIGNIFICANT CHANGE UP (ref 3.8–10.5)

## 2023-11-29 NOTE — H&P PST ADULT - OTHER CARE PROVIDERS
Dr Jozef Walls MD, PhD --[phone] Dr Jozef Walls MD, PhD --[phone] Dr Jozef Walls MD, PhD --[phone]

## 2023-11-29 NOTE — H&P PST ADULT - NEUROLOGICAL SYMPTOMS
last episode on 11/25/23 went [facility] ED/generalized seizures last episode on 11/25/23 went [facility] ED/generalized seizures last episode on 11/25/23 went [facility] ED/generalized seizures

## 2023-11-29 NOTE — H&P PST ADULT - HISTORY OF PRESENT ILLNESS
Patient is 48 year old female with hx bipolar with depression, HTN presented to PST with pre op dx of primary hyper parathyroidectomy and nontoxic multinodular goiter . Patient is scheduled for parathyroidectomy with parathyroid hormone assay with resection of mediastinal parathyroid adenoma , left thyroid lobectomy , possible total thyroidectomy with central neck dissection

## 2023-11-29 NOTE — H&P PST ADULT - ENDOCRINE COMMENTS
primary hyperparathyroidisms and nontoxic multinodular thyroid primary hyperparathyroidism and multinodular thyroid

## 2023-11-29 NOTE — H&P PST ADULT - PROBLEM SELECTOR PLAN 3
instructed tp otian neuro eval due to recent episdoe Patient instructed to take lacosamide with a sip of water on the morning of procedure.       instructed  obtain neuro eval due to recent episode Patient instructed to take lacosamide with a sip of water on the morning of procedure.     recently started on lacosamide , Patient denies any seizure since , neuro note in the chart,

## 2023-11-29 NOTE — H&P PST ADULT - NSANTHOSAYNRD_GEN_A_CORE
No. KARMA screening performed.  STOP BANG Legend: 0-2 = LOW Risk; 3-4 = INTERMEDIATE Risk; 5-8 = HIGH Risk

## 2023-11-29 NOTE — H&P PST ADULT - NSICDXPASTMEDICALHX_GEN_ALL_CORE_FT
PAST MEDICAL HISTORY:  Bipolar depression     Bipolar illness     Hypertension     Seizure disorder

## 2023-11-29 NOTE — H&P PST ADULT - NSICDXFAMILYHX_GEN_ALL_CORE_FT
FAMILY HISTORY:  Father  Still living? No  FH: multiple myeloma, Age at diagnosis: Age Unknown    Mother  Still living? No  FHx: breast cancer, Age at diagnosis: Age Unknown  FHx: lung cancer, Age at diagnosis: Age Unknown  FHx: non-Hodgkin's lymphoma, Age at diagnosis: Age Unknown

## 2023-11-29 NOTE — H&P PST ADULT - PROBLEM SELECTOR PLAN 1
Patient tentatively scheduled for    Pre-op instructions provided. Pt given verbal and written instructions with teach back on chlorhexidine shampoo and pepcid. Pt verbalized understanding with return demonstration. Patient tentatively scheduled for or parathyroidectomy with parathyroid hormone assay with resection of mediastinal parathyroid adenoma , left thyroid lobectomy , possible total thyroidectomy with central neck dissection       Pre-op instructions provided. Pt given verbal and written instructions with teach back on chlorhexidine shampoo and pepcid. Pt verbalized understanding with return demonstration.

## 2023-12-01 ENCOUNTER — NON-APPOINTMENT (OUTPATIENT)
Age: 48
End: 2023-12-01

## 2023-12-04 ENCOUNTER — TRANSCRIPTION ENCOUNTER (OUTPATIENT)
Age: 48
End: 2023-12-04

## 2023-12-04 NOTE — ASU PATIENT PROFILE, ADULT - NS PRO MODE OF ARRIVAL
ambulatory Ketoconazole Counseling:   Patient counseled regarding improving absorption with orange juice.  Adverse effects include but are not limited to breast enlargement, headache, diarrhea, nausea, upset stomach, liver function test abnormalities, taste disturbance, and stomach pain.  There is a rare possibility of liver failure that can occur when taking ketoconazole. The patient understands that monitoring of LFTs may be required, especially at baseline. The patient verbalized understanding of the proper use and possible adverse effects of ketoconazole.  All of the patient's questions and concerns were addressed.

## 2023-12-04 NOTE — ASU PATIENT PROFILE, ADULT - SURGICAL SITE INCISION
Gout:   Start Allopurinol. Increase the dose over time as prescribed.   Use Indomethacin as needed at the first sign of any gout attack.   Return to clinic in 3 months for lab work and refill of medication.     Go to the eye doctor, dentist.     Preventive Health Recommendations  Male Ages 50 - 64    Yearly exam:             See your health care provider every year in order to  o   Review health changes.   o   Discuss preventive care.    o   Review your medicines if your doctor has prescribed any.   Have a cholesterol test every 5 years, or more frequently if you are at risk for high cholesterol/heart disease.   Have a diabetes test (fasting glucose) every three years. If you are at risk for diabetes, you should have this test more often.   Have a colonoscopy at age 50, or have a yearly FIT test (stool test). These exams will check for colon cancer.    Talk with your health care provider about whether or not a prostate cancer screening test (PSA) is right for you.  You should be tested each year for STDs (sexually transmitted diseases), if you re at risk.     Shots: Get a flu shot each year. Get a tetanus shot every 10 years.     Nutrition:  Eat at least 5 servings of fruits and vegetables daily.   Eat whole-grain bread, whole-wheat pasta and brown rice instead of white grains and rice.   Get adequate Calcium and Vitamin D.     Lifestyle  Exercise for at least 150 minutes a week (30 minutes a day, 5 days a week). This will help you control your weight and prevent disease.   Limit alcohol to one drink per day.   No smoking.   Wear sunscreen to prevent skin cancer.   See your dentist every six months for an exam and cleaning.   See your eye doctor every 1 to 2 years.    Preventive Health Recommendations  Male Ages 50 - 64    Yearly exam:             See your health care provider every year in order to  o   Review health changes.   o   Discuss preventive care.    o   Review your medicines if your doctor has prescribed  any.   Have a cholesterol test every 5 years, or more frequently if you are at risk for high cholesterol/heart disease.   Have a diabetes test (fasting glucose) every three years. If you are at risk for diabetes, you should have this test more often.   Have a colonoscopy at age 50, or have a yearly FIT test (stool test). These exams will check for colon cancer.    Talk with your health care provider about whether or not a prostate cancer screening test (PSA) is right for you.  You should be tested each year for STDs (sexually transmitted diseases), if you re at risk.     Shots: Get a flu shot each year. Get a tetanus shot every 10 years.     Nutrition:  Eat at least 5 servings of fruits and vegetables daily.   Eat whole-grain bread, whole-wheat pasta and brown rice instead of white grains and rice.   Get adequate Calcium and Vitamin D.     Lifestyle  Exercise for at least 150 minutes a week (30 minutes a day, 5 days a week). This will help you control your weight and prevent disease.   Limit alcohol to one drink per day.   No smoking.   Wear sunscreen to prevent skin cancer.   See your dentist every six months for an exam and cleaning.   See your eye doctor every 1 to 2 years.    Preventive Health Recommendations  Male Ages 50 - 64    Yearly exam:             See your health care provider every year in order to  o   Review health changes.   o   Discuss preventive care.    o   Review your medicines if your doctor has prescribed any.   Have a cholesterol test every 5 years, or more frequently if you are at risk for high cholesterol/heart disease.   Have a diabetes test (fasting glucose) every three years. If you are at risk for diabetes, you should have this test more often.   Have a colonoscopy at age 50, or have a yearly FIT test (stool test). These exams will check for colon cancer.    Talk with your health care provider about whether or not a prostate cancer screening test (PSA) is right for you.  You should be  tested each year for STDs (sexually transmitted diseases), if you re at risk.     Shots: Get a flu shot each year. Get a tetanus shot every 10 years.     Nutrition:  Eat at least 5 servings of fruits and vegetables daily.   Eat whole-grain bread, whole-wheat pasta and brown rice instead of white grains and rice.   Get adequate Calcium and Vitamin D.     Lifestyle  Exercise for at least 150 minutes a week (30 minutes a day, 5 days a week). This will help you control your weight and prevent disease.   Limit alcohol to one drink per day.   No smoking.   Wear sunscreen to prevent skin cancer.   See your dentist every six months for an exam and cleaning.   See your eye doctor every 1 to 2 years.    Preventive Health Recommendations  Male Ages 50 - 64    Yearly exam:             See your health care provider every year in order to  o   Review health changes.   o   Discuss preventive care.    o   Review your medicines if your doctor has prescribed any.   Have a cholesterol test every 5 years, or more frequently if you are at risk for high cholesterol/heart disease.   Have a diabetes test (fasting glucose) every three years. If you are at risk for diabetes, you should have this test more often.   Have a colonoscopy at age 50, or have a yearly FIT test (stool test). These exams will check for colon cancer.    Talk with your health care provider about whether or not a prostate cancer screening test (PSA) is right for you.  You should be tested each year for STDs (sexually transmitted diseases), if you re at risk.     Shots: Get a flu shot each year. Get a tetanus shot every 10 years.     Nutrition:  Eat at least 5 servings of fruits and vegetables daily.   Eat whole-grain bread, whole-wheat pasta and brown rice instead of white grains and rice.   Get adequate Calcium and Vitamin D.     Lifestyle  Exercise for at least 150 minutes a week (30 minutes a day, 5 days a week). This will help you control your weight and prevent  disease.   Limit alcohol to one drink per day.   No smoking.   Wear sunscreen to prevent skin cancer.   See your dentist every six months for an exam and cleaning.   See your eye doctor every 1 to 2 years.     no

## 2023-12-04 NOTE — ASU PATIENT PROFILE, ADULT - FALL HARM RISK - UNIVERSAL INTERVENTIONS
Bed in lowest position, wheels locked, appropriate side rails in place/Call bell, personal items and telephone in reach/Instruct patient to call for assistance before getting out of bed or chair/Non-slip footwear when patient is out of bed/[place] to call system/Physically safe environment - no spills, clutter or unnecessary equipment/Purposeful Proactive Rounding/Room/bathroom lighting operational, light cord in reach Bed in lowest position, wheels locked, appropriate side rails in place/Call bell, personal items and telephone in reach/Instruct patient to call for assistance before getting out of bed or chair/Non-slip footwear when patient is out of bed/[place] to call system/Physically safe environment - no spills, clutter or unnecessary equipment/Purposeful Proactive Rounding/Room/bathroom lighting operational, light cord in reach

## 2023-12-05 ENCOUNTER — APPOINTMENT (OUTPATIENT)
Dept: SURGERY | Facility: HOSPITAL | Age: 48
End: 2023-12-05

## 2023-12-05 ENCOUNTER — RESULT REVIEW (OUTPATIENT)
Age: 48
End: 2023-12-05

## 2023-12-05 ENCOUNTER — INPATIENT (INPATIENT)
Facility: HOSPITAL | Age: 48
LOS: 0 days | Discharge: HOME | End: 2023-12-06
Attending: SURGERY | Admitting: SURGERY
Payer: COMMERCIAL

## 2023-12-05 ENCOUNTER — TRANSCRIPTION ENCOUNTER (OUTPATIENT)
Age: 48
End: 2023-12-05

## 2023-12-05 ENCOUNTER — APPOINTMENT (OUTPATIENT)
Dept: THORACIC SURGERY | Facility: HOSPITAL | Age: 48
End: 2023-12-05

## 2023-12-05 VITALS
HEART RATE: 70 BPM | TEMPERATURE: 98 F | HEIGHT: 65 IN | OXYGEN SATURATION: 98 % | WEIGHT: 188.94 LBS | RESPIRATION RATE: 16 BRPM | SYSTOLIC BLOOD PRESSURE: 142 MMHG | DIASTOLIC BLOOD PRESSURE: 97 MMHG

## 2023-12-05 DIAGNOSIS — E04.9 NONTOXIC GOITER, UNSPECIFIED: ICD-10-CM

## 2023-12-05 DIAGNOSIS — Z86.39 PERSONAL HISTORY OF OTHER ENDOCRINE, NUTRITIONAL AND METABOLIC DISEASE: ICD-10-CM

## 2023-12-05 DIAGNOSIS — Z98.890 OTHER SPECIFIED POSTPROCEDURAL STATES: Chronic | ICD-10-CM

## 2023-12-05 DIAGNOSIS — E21.0 PRIMARY HYPERPARATHYROIDISM: ICD-10-CM

## 2023-12-05 PROCEDURE — 88332 PATH CONSLTJ SURG EA ADD BLK: CPT | Mod: 26

## 2023-12-05 PROCEDURE — 88307 TISSUE EXAM BY PATHOLOGIST: CPT | Mod: 26

## 2023-12-05 PROCEDURE — 88331 PATH CONSLTJ SURG 1 BLK 1SPC: CPT | Mod: 26

## 2023-12-05 PROCEDURE — 88305 TISSUE EXAM BY PATHOLOGIST: CPT | Mod: 26

## 2023-12-05 DEVICE — CLIP APPLIER COVIDIEN SURGICLIP 11.5" MEDIUM: Type: IMPLANTABLE DEVICE | Status: FUNCTIONAL

## 2023-12-05 DEVICE — CLIP APPLIER COVIDIEN SURGICLIP III 9" SM: Type: IMPLANTABLE DEVICE | Status: FUNCTIONAL

## 2023-12-05 RX ORDER — SERTRALINE 25 MG/1
75 TABLET, FILM COATED ORAL DAILY
Refills: 0 | Status: DISCONTINUED | OUTPATIENT
Start: 2023-12-05 | End: 2023-12-05

## 2023-12-05 RX ORDER — LACOSAMIDE 50 MG/1
50 TABLET ORAL
Refills: 0 | Status: DISCONTINUED | OUTPATIENT
Start: 2023-12-05 | End: 2023-12-06

## 2023-12-05 RX ORDER — CALCIUM CARBONATE 500(1250)
1 TABLET ORAL
Refills: 0 | Status: DISCONTINUED | OUTPATIENT
Start: 2023-12-05 | End: 2023-12-06

## 2023-12-05 RX ORDER — SODIUM CHLORIDE 9 MG/ML
1000 INJECTION, SOLUTION INTRAVENOUS
Refills: 0 | Status: DISCONTINUED | OUTPATIENT
Start: 2023-12-05 | End: 2023-12-06

## 2023-12-05 RX ORDER — OXYCODONE HYDROCHLORIDE 5 MG/1
5 TABLET ORAL EVERY 6 HOURS
Refills: 0 | Status: DISCONTINUED | OUTPATIENT
Start: 2023-12-05 | End: 2023-12-06

## 2023-12-05 RX ORDER — ACETAMINOPHEN 500 MG
1000 TABLET ORAL EVERY 6 HOURS
Refills: 0 | Status: DISCONTINUED | OUTPATIENT
Start: 2023-12-05 | End: 2023-12-06

## 2023-12-05 RX ORDER — SERTRALINE 25 MG/1
75 TABLET, FILM COATED ORAL DAILY
Refills: 0 | Status: DISCONTINUED | OUTPATIENT
Start: 2023-12-05 | End: 2023-12-06

## 2023-12-05 RX ORDER — SERTRALINE 25 MG/1
25 TABLET, FILM COATED ORAL DAILY
Refills: 0 | Status: DISCONTINUED | OUTPATIENT
Start: 2023-12-05 | End: 2023-12-05

## 2023-12-05 RX ORDER — LURASIDONE HYDROCHLORIDE 40 MG/1
40 TABLET ORAL DAILY
Refills: 0 | Status: DISCONTINUED | OUTPATIENT
Start: 2023-12-05 | End: 2023-12-06

## 2023-12-05 RX ORDER — OXYCODONE HYDROCHLORIDE 5 MG/1
2.5 TABLET ORAL EVERY 6 HOURS
Refills: 0 | Status: DISCONTINUED | OUTPATIENT
Start: 2023-12-05 | End: 2023-12-06

## 2023-12-05 RX ORDER — LITHIUM CARBONATE 300 MG/1
900 TABLET, EXTENDED RELEASE ORAL AT BEDTIME
Refills: 0 | Status: DISCONTINUED | OUTPATIENT
Start: 2023-12-05 | End: 2023-12-06

## 2023-12-05 RX ORDER — AMLODIPINE BESYLATE 2.5 MG/1
5 TABLET ORAL DAILY
Refills: 0 | Status: DISCONTINUED | OUTPATIENT
Start: 2023-12-05 | End: 2023-12-05

## 2023-12-05 RX ADMIN — Medication 1000 MILLIGRAM(S): at 22:34

## 2023-12-05 RX ADMIN — LITHIUM CARBONATE 900 MILLIGRAM(S): 300 TABLET, EXTENDED RELEASE ORAL at 22:52

## 2023-12-05 RX ADMIN — Medication 1 TABLET(S): at 22:05

## 2023-12-05 RX ADMIN — Medication 400 MILLIGRAM(S): at 22:04

## 2023-12-05 RX ADMIN — SODIUM CHLORIDE 50 MILLILITER(S): 9 INJECTION, SOLUTION INTRAVENOUS at 18:53

## 2023-12-05 RX ADMIN — LACOSAMIDE 50 MILLIGRAM(S): 50 TABLET ORAL at 20:54

## 2023-12-05 NOTE — CHART NOTE - NSCHARTNOTEFT_GEN_A_CORE
POST-OP NOTE    CORINNA BUI | [id number] | J 8S 862 B    Procedure: s/p left thyroid lobectomy, mediastinal parathyroid exploration with partial cervical thymectomy and excision of intrathymic parathyroid     Subjective: Patient seen and examined several hours postoperatively. Reports feeling well after surgery. Pain is controlled. Tolerating water. Reports she does not like apple juice or green jello. Patient reports that she voided but did not save her urine for measurement.     Vital Signs Last 24 Hrs  T(C): 36.5 (05 Dec 2023 21:00), Max: 36.9 (05 Dec 2023 10:24)  T(F): 97.7 (05 Dec 2023 21:00), Max: 98.4 (05 Dec 2023 10:24)  HR: 87 (05 Dec 2023 21:00) (70 - 88)  BP: 114/75 (05 Dec 2023 21:00) (109/74 - 142/97)  BP(mean): 87 (05 Dec 2023 21:00) (85 - 97)  RR: 17 (05 Dec 2023 21:00) (13 - 23)  SpO2: 97% (05 Dec 2023 21:00) (93% - 99%)    Parameters below as of 05 Dec 2023 21:00  Patient On (Oxygen Delivery Method): room air      I&O's Summary    05 Dec 2023 07:01  -  05 Dec 2023 22:16  --------------------------------------------------------  IN: 1810 mL / OUT: 7.5 mL / NET: 1802.5 mL        PHYSICAL EXAM:  Gen: NAD  Resp: breathing easily, no stridor  CV: RRR  Neck: Steri-strips over incision are c/d/i. No evidence of hematoma. PJ drain in place on right side with serosang output.     Assessment: 49 yo F s/p left thyroid lobectomy, mediastinal parathyroid exploration with partial cervical thymectomy and excision of intrathymic parathyroid     Plan:  -Diet advanced to regular  -Follow up urine output   -pain control POST-OP NOTE    CORINNA BUI | [id number] | J 8S 862 B    Procedure: s/p left thyroid lobectomy, mediastinal parathyroid exploration with partial cervical thymectomy and excision of intrathymic parathyroid     Subjective: Patient seen and examined several hours postoperatively. Reports feeling well after surgery. Pain is controlled. Tolerating water. Reports she does not like apple juice or green jello. Patient reports that she voided but did not save her urine for measurement.     Vital Signs Last 24 Hrs  T(C): 36.5 (05 Dec 2023 21:00), Max: 36.9 (05 Dec 2023 10:24)  T(F): 97.7 (05 Dec 2023 21:00), Max: 98.4 (05 Dec 2023 10:24)  HR: 87 (05 Dec 2023 21:00) (70 - 88)  BP: 114/75 (05 Dec 2023 21:00) (109/74 - 142/97)  BP(mean): 87 (05 Dec 2023 21:00) (85 - 97)  RR: 17 (05 Dec 2023 21:00) (13 - 23)  SpO2: 97% (05 Dec 2023 21:00) (93% - 99%)    Parameters below as of 05 Dec 2023 21:00  Patient On (Oxygen Delivery Method): room air      I&O's Summary    05 Dec 2023 07:01  -  05 Dec 2023 22:16  --------------------------------------------------------  IN: 1810 mL / OUT: 7.5 mL / NET: 1802.5 mL        PHYSICAL EXAM:  Gen: NAD  Resp: breathing easily, no stridor  CV: RRR  Neck: Steri-strips over incision are c/d/i. No evidence of hematoma. PJ drain in place on right side with serosang output.     Assessment: 49 yo F s/p left thyroid lobectomy, mediastinal parathyroid exploration with partial cervical thymectomy and excision of intrathymic parathyroid     Plan:  -Diet advanced to regular  -Follow up urine output   -pain control

## 2023-12-05 NOTE — BRIEF OPERATIVE NOTE - NSICDXBRIEFPROCEDURE_GEN_ALL_CORE_FT
PROCEDURES:  Left thyroid lobectomy 05-Dec-2023 18:33:19  Reno Ha  Left thyroid lobectomy 05-Dec-2023 18:34:55  Reno Ha

## 2023-12-05 NOTE — BRIEF OPERATIVE NOTE - OPERATION/FINDINGS
Under anesthesia, low anterior 10cm cervical incision was performed. Platisma was identified, dissection was performed until left thyroid lobe was visualized. Cystic and calcified components where identified in the left side and left thyroid lobectomy was performed. Neuro monitoring was performed to confirm laryngeal nerve conservation. Left inferior parathyroid was identified in thyroid isthmus and resection was performed. PTH levels where drawn 10, 20 and 30min post excision with appropriate decreasing trend. Frozen was send to pathology and incision was primarily closed leaving a PJ in front of platysma. Hemostasis achieved. Under anesthesia, low anterior horizontal 10cm cervical incision was performed. Dissection of planes performed, platysma was identified, further dissection performed until left thyroid lobe was visualized. Cystic and calcified components where identified in the left side and left thyroid lobectomy was performed. Neuro monitoring was performed to confirm laryngeal nerve conservation. Left inferior parathyroid was identified in thyroid isthmus and resection was performed. PTH levels where drawn 10, 20 and 30min post excision with appropriate decreasing trend. Frozen was send to pathology and incision was primarily closed leaving a PJ in front of platysma. Hemostasis achieved. Under anesthesia, low anterior horizontal 10cm cervical incision was performed. Dissection of planes performed, platysma was identified, further dissection performed until left thyroid lobe was visualized. Cystic and calcified components where identified in the left side and left thyroid lobectomy was performed. Neuro monitoring was performed to confirm laryngeal nerve conservation. Left inferior parathyroid was identified in thyroid isthmus and resection was performed. PTH levels where drawn 10, 20 and 30min post excision with appropriate decreasing trend. Frozen was send to pathology and incision was primarily closed leaving a PJ on platysma. Hemostasis achieved. Fascia closed with interrupted sutures. Layered skin closure with Monocryl.

## 2023-12-05 NOTE — ASU PREOP CHECKLIST - HEIGHT IN INCHES
----- Message from Jose Jeffrey MA sent at 12/3/2019 12:28 PM CST -----  Contact: Patient's Daughter in Law hospitals      ----- Message -----  From: Abigail Mcclendon  Sent: 12/3/2019  12:19 PM CST  To: [facility] Staff    The Pt's daughter in law wants you to call the son Marisa to schedule the procedure. Please call him @ [phone].Abigail Pena   
Left message for patient's son to return call. Call back # left.   
[phone]

## 2023-12-06 ENCOUNTER — TRANSCRIPTION ENCOUNTER (OUTPATIENT)
Age: 48
End: 2023-12-06

## 2023-12-06 VITALS
SYSTOLIC BLOOD PRESSURE: 106 MMHG | TEMPERATURE: 98 F | RESPIRATION RATE: 18 BRPM | HEART RATE: 75 BPM | DIASTOLIC BLOOD PRESSURE: 72 MMHG | OXYGEN SATURATION: 98 %

## 2023-12-06 LAB
ANION GAP SERPL CALC-SCNC: 7 MMOL/L — SIGNIFICANT CHANGE UP (ref 7–14)
ANION GAP SERPL CALC-SCNC: 7 MMOL/L — SIGNIFICANT CHANGE UP (ref 7–14)
BUN SERPL-MCNC: 19 MG/DL — SIGNIFICANT CHANGE UP (ref 7–23)
BUN SERPL-MCNC: 19 MG/DL — SIGNIFICANT CHANGE UP (ref 7–23)
CALCIUM SERPL-MCNC: 9.7 MG/DL — SIGNIFICANT CHANGE UP (ref 8.4–10.5)
CALCIUM SERPL-MCNC: 9.7 MG/DL — SIGNIFICANT CHANGE UP (ref 8.4–10.5)
CHLORIDE SERPL-SCNC: 109 MMOL/L — HIGH (ref 98–107)
CHLORIDE SERPL-SCNC: 109 MMOL/L — HIGH (ref 98–107)
CO2 SERPL-SCNC: 25 MMOL/L — SIGNIFICANT CHANGE UP (ref 22–31)
CO2 SERPL-SCNC: 25 MMOL/L — SIGNIFICANT CHANGE UP (ref 22–31)
CREAT SERPL-MCNC: 1.41 MG/DL — HIGH (ref 0.5–1.3)
CREAT SERPL-MCNC: 1.41 MG/DL — HIGH (ref 0.5–1.3)
EGFR: 45 ML/MIN/1.73M2 — LOW
EGFR: 45 ML/MIN/1.73M2 — LOW
GLUCOSE SERPL-MCNC: 104 MG/DL — HIGH (ref 70–99)
GLUCOSE SERPL-MCNC: 104 MG/DL — HIGH (ref 70–99)
HCT VFR BLD CALC: 37.5 % — SIGNIFICANT CHANGE UP (ref 34.5–45)
HCT VFR BLD CALC: 37.5 % — SIGNIFICANT CHANGE UP (ref 34.5–45)
HGB BLD-MCNC: 12.2 G/DL — SIGNIFICANT CHANGE UP (ref 11.5–15.5)
HGB BLD-MCNC: 12.2 G/DL — SIGNIFICANT CHANGE UP (ref 11.5–15.5)
LITHIUM SERPL-MCNC: 1.4 MMOL/L — HIGH (ref 0.6–1.2)
LITHIUM SERPL-MCNC: 1.4 MMOL/L — HIGH (ref 0.6–1.2)
MAGNESIUM SERPL-MCNC: 2 MG/DL — SIGNIFICANT CHANGE UP (ref 1.6–2.6)
MAGNESIUM SERPL-MCNC: 2 MG/DL — SIGNIFICANT CHANGE UP (ref 1.6–2.6)
MCHC RBC-ENTMCNC: 31.8 PG — SIGNIFICANT CHANGE UP (ref 27–34)
MCHC RBC-ENTMCNC: 31.8 PG — SIGNIFICANT CHANGE UP (ref 27–34)
MCHC RBC-ENTMCNC: 32.5 GM/DL — SIGNIFICANT CHANGE UP (ref 32–36)
MCHC RBC-ENTMCNC: 32.5 GM/DL — SIGNIFICANT CHANGE UP (ref 32–36)
MCV RBC AUTO: 97.7 FL — SIGNIFICANT CHANGE UP (ref 80–100)
MCV RBC AUTO: 97.7 FL — SIGNIFICANT CHANGE UP (ref 80–100)
NRBC # BLD: 0 /100 WBCS — SIGNIFICANT CHANGE UP (ref 0–0)
NRBC # BLD: 0 /100 WBCS — SIGNIFICANT CHANGE UP (ref 0–0)
NRBC # FLD: 0 K/UL — SIGNIFICANT CHANGE UP (ref 0–0)
NRBC # FLD: 0 K/UL — SIGNIFICANT CHANGE UP (ref 0–0)
PHOSPHATE SERPL-MCNC: 2.7 MG/DL — SIGNIFICANT CHANGE UP (ref 2.5–4.5)
PHOSPHATE SERPL-MCNC: 2.7 MG/DL — SIGNIFICANT CHANGE UP (ref 2.5–4.5)
PLATELET # BLD AUTO: 231 K/UL — SIGNIFICANT CHANGE UP (ref 150–400)
PLATELET # BLD AUTO: 231 K/UL — SIGNIFICANT CHANGE UP (ref 150–400)
POTASSIUM SERPL-MCNC: 3.7 MMOL/L — SIGNIFICANT CHANGE UP (ref 3.5–5.3)
POTASSIUM SERPL-MCNC: 3.7 MMOL/L — SIGNIFICANT CHANGE UP (ref 3.5–5.3)
POTASSIUM SERPL-SCNC: 3.7 MMOL/L — SIGNIFICANT CHANGE UP (ref 3.5–5.3)
POTASSIUM SERPL-SCNC: 3.7 MMOL/L — SIGNIFICANT CHANGE UP (ref 3.5–5.3)
RBC # BLD: 3.84 M/UL — SIGNIFICANT CHANGE UP (ref 3.8–5.2)
RBC # BLD: 3.84 M/UL — SIGNIFICANT CHANGE UP (ref 3.8–5.2)
RBC # FLD: 11.9 % — SIGNIFICANT CHANGE UP (ref 10.3–14.5)
RBC # FLD: 11.9 % — SIGNIFICANT CHANGE UP (ref 10.3–14.5)
SODIUM SERPL-SCNC: 141 MMOL/L — SIGNIFICANT CHANGE UP (ref 135–145)
SODIUM SERPL-SCNC: 141 MMOL/L — SIGNIFICANT CHANGE UP (ref 135–145)
WBC # BLD: 9.2 K/UL — SIGNIFICANT CHANGE UP (ref 3.8–10.5)
WBC # BLD: 9.2 K/UL — SIGNIFICANT CHANGE UP (ref 3.8–10.5)
WBC # FLD AUTO: 9.2 K/UL — SIGNIFICANT CHANGE UP (ref 3.8–10.5)
WBC # FLD AUTO: 9.2 K/UL — SIGNIFICANT CHANGE UP (ref 3.8–10.5)

## 2023-12-06 RX ORDER — CALCIUM CARBONATE 500(1250)
1 TABLET ORAL
Qty: 0 | Refills: 0 | DISCHARGE
Start: 2023-12-06

## 2023-12-06 RX ADMIN — Medication 1 TABLET(S): at 05:14

## 2023-12-06 RX ADMIN — Medication 400 MILLIGRAM(S): at 12:02

## 2023-12-06 RX ADMIN — LACOSAMIDE 50 MILLIGRAM(S): 50 TABLET ORAL at 05:14

## 2023-12-06 RX ADMIN — Medication 400 MILLIGRAM(S): at 05:15

## 2023-12-06 NOTE — DISCHARGE NOTE PROVIDER - PROVIDER TOKENS
PROVIDER:[TOKEN:[[id number]],FOLLOWUP:[1 week]] PROVIDER:[TOKEN:[[id number]],FOLLOWUP:[1 week]]

## 2023-12-06 NOTE — DISCHARGE NOTE NURSING/CASE MANAGEMENT/SOCIAL WORK - PATIENT PORTAL LINK FT
You can access the FollowMyHealth Patient Portal offered by [facility] by registering at the following website: [email or web address]. By joining [employer]’s FollowMyHealth portal, you will also be able to view your health information using other applications (apps) compatible with our system. You can access the FollowMyHealth Patient Portal offered by [facility] by registering at the following website: [email or web address]. By joining [facility]’s FollowMyHealth portal, you will also be able to view your health information using other applications (apps) compatible with our system.

## 2023-12-06 NOTE — DISCHARGE NOTE PROVIDER - CARE PROVIDER_API CALL
Tex Garcia Rey  Surgery  [address], [address]  [place] [address]  Phone: [phone]  Fax: [phone]  Follow Up Time: 1 week   Tex Garcia Rey  Surgery  [address], [address]  [place] [address]  Phone: [phone]  Fax: [phone]  Follow Up Time: 1 week

## 2023-12-06 NOTE — DISCHARGE NOTE PROVIDER - HOSPITAL COURSE
48 year old female with multinodular goiter presents s/p L thyroid lobectomy. Patient tolerated operation well and there were no post-operative complications identified. Patient remained hemodynamically stable in the PACU and transferred to the surgical floor. Diet was restarted and advanced as tolerated. Pain control was transitioned from IV to PO pain meds. At this time, patient is currently ambulating, voiding, tolerating a regular diet. Pain well controlled on PO pain meds. Patient has been deemed stable for discharge home with follow up as an outpatient.

## 2023-12-06 NOTE — PROGRESS NOTE ADULT - SUBJECTIVE AND OBJECTIVE BOX
Surgery Daily Progress Note  =====================================================  SUBJECTIVE: A 48y Female Patient seen and examined at bedside on AM rounds. No acute events overnight.    ALLERGIES:  No Known Allergies      --------------------------------------------------------------------------------------    MEDICATIONS:    Neurologic Medications  acetaminophen   IVPB .. 1000 milliGRAM(s) IV Intermittent every 6 hours  lacosamide 50 milliGRAM(s) Oral two times a day  lithium 900 milliGRAM(s) Oral at bedtime  lurasidone 40 milliGRAM(s) Oral daily  oxyCODONE    IR 2.5 milliGRAM(s) Oral every 6 hours PRN Moderate Pain (4 - 6)  oxyCODONE    IR 5 milliGRAM(s) Oral every 6 hours PRN Severe Pain (7 - 10)  sertraline 75 milliGRAM(s) Oral daily    Respiratory Medications    Cardiovascular Medications    Gastrointestinal Medications  calcium carbonate   1250 mG (OsCal) 1 Tablet(s) Oral two times a day  lactated ringers. 1000 milliLiter(s) IV Continuous <Continuous>    Genitourinary Medications    Hematologic/Oncologic Medications    Antimicrobial/Immunologic Medications    Endocrine/Metabolic Medications    Topical/Other Medications    --------------------------------------------------------------------------------------    VITAL SIGNS:  No Known Allergies      T(C): 37.1 (12-06-23 @ 02:22), Max: 37.1 (12-06-23 @ 02:22)  HR: 79 (12-06-23 @ 02:22) (70 - 88)  BP: 102/60 (12-06-23 @ 02:22) (102/60 - 142/97)  RR: 18 (12-06-23 @ 02:22) (13 - 23)  SpO2: 95% (12-06-23 @ 02:22) (93% - 99%)  --------------------------------------------------------------------------------------    EXAM    General: NAD, resting in bed comfortably.  Cardiac: regular rate, warm and well perfused  Respiratory: Nonlabored respirations, normal cw expansion.  Abdomen: soft, nontender, nondistended. ___ incision is c/d/i, ostomy, NGT, schwab.   Extremities: normal strength, FROM, no deformities    --------------------------------------------------------------------------------------    I&Os  T(C): 37.1 (12-06-23 @ 02:22), Max: 37.1 (12-06-23 @ 02:22)  HR: 79 (12-06-23 @ 02:22) (70 - 88)  BP: 102/60 (12-06-23 @ 02:22) (102/60 - 142/97)  RR: 18 (12-06-23 @ 02:22) (13 - 23)  SpO2: 95% (12-06-23 @ 02:22) (93% - 99%)  --------------------------------------------------------------------------------------    LABS                  12-05-23 @ 07:01  -  12-06-23 @ 05:40  --------------------------------------------------------  IN: 2010 mL / OUT: 317.5 mL / NET: 1692.5 mL      acetaminophen   IVPB .. 1000 milliGRAM(s) IV Intermittent every 6 hours  calcium carbonate   1250 mG (OsCal) 1 Tablet(s) Oral two times a day  lacosamide 50 milliGRAM(s) Oral two times a day  lactated ringers. 1000 milliLiter(s) IV Continuous <Continuous>  lithium 900 milliGRAM(s) Oral at bedtime  lurasidone 40 milliGRAM(s) Oral daily  oxyCODONE    IR 5 milliGRAM(s) Oral every 6 hours PRN  oxyCODONE    IR 2.5 milliGRAM(s) Oral every 6 hours PRN  sertraline 75 milliGRAM(s) Oral daily      RADIOLOGY, EKG & ADDITIONAL TESTS: Reviewed.  Surgery Daily Progress Note  =====================================================  SUBJECTIVE: A 48y Female Patient seen and examined at bedside on AM rounds. No acute events overnight. Patient denies fever, chills, or SOB, numbness or tingling.    ALLERGIES:  No Known Allergies      --------------------------------------------------------------------------------------    MEDICATIONS:    Neurologic Medications  acetaminophen   IVPB .. 1000 milliGRAM(s) IV Intermittent every 6 hours  lacosamide 50 milliGRAM(s) Oral two times a day  lithium 900 milliGRAM(s) Oral at bedtime  lurasidone 40 milliGRAM(s) Oral daily  oxyCODONE    IR 2.5 milliGRAM(s) Oral every 6 hours PRN Moderate Pain (4 - 6)  oxyCODONE    IR 5 milliGRAM(s) Oral every 6 hours PRN Severe Pain (7 - 10)  sertraline 75 milliGRAM(s) Oral daily    Respiratory Medications    Cardiovascular Medications    Gastrointestinal Medications  calcium carbonate   1250 mG (OsCal) 1 Tablet(s) Oral two times a day  lactated ringers. 1000 milliLiter(s) IV Continuous <Continuous>    Genitourinary Medications    Hematologic/Oncologic Medications    Antimicrobial/Immunologic Medications    Endocrine/Metabolic Medications    Topical/Other Medications    --------------------------------------------------------------------------------------    VITAL SIGNS:  No Known Allergies      T(C): 37.1 (12-06-23 @ 02:22), Max: 37.1 (12-06-23 @ 02:22)  HR: 79 (12-06-23 @ 02:22) (70 - 88)  BP: 102/60 (12-06-23 @ 02:22) (102/60 - 142/97)  RR: 18 (12-06-23 @ 02:22) (13 - 23)  SpO2: 95% (12-06-23 @ 02:22) (93% - 99%)  --------------------------------------------------------------------------------------    EXAM    General: NAD, resting in bed comfortably.  Cardiac: regular rate, warm and well perfused  Respiratory: Nonlabored respirations, normal cw expansion.  Abdomen: soft, nontender, nondistended. ___ incision is c/d/i, ostomy, NGT, schwab.   Extremities: normal strength, FROM, no deformities    --------------------------------------------------------------------------------------    I&Os  T(C): 37.1 (12-06-23 @ 02:22), Max: 37.1 (12-06-23 @ 02:22)  HR: 79 (12-06-23 @ 02:22) (70 - 88)  BP: 102/60 (12-06-23 @ 02:22) (102/60 - 142/97)  RR: 18 (12-06-23 @ 02:22) (13 - 23)  SpO2: 95% (12-06-23 @ 02:22) (93% - 99%)  --------------------------------------------------------------------------------------    LABS                  12-05-23 @ 07:01  -  12-06-23 @ 05:40  --------------------------------------------------------  IN: 2010 mL / OUT: 317.5 mL / NET: 1692.5 mL      acetaminophen   IVPB .. 1000 milliGRAM(s) IV Intermittent every 6 hours  calcium carbonate   1250 mG (OsCal) 1 Tablet(s) Oral two times a day  lacosamide 50 milliGRAM(s) Oral two times a day  lactated ringers. 1000 milliLiter(s) IV Continuous <Continuous>  lithium 900 milliGRAM(s) Oral at bedtime  lurasidone 40 milliGRAM(s) Oral daily  oxyCODONE    IR 5 milliGRAM(s) Oral every 6 hours PRN  oxyCODONE    IR 2.5 milliGRAM(s) Oral every 6 hours PRN  sertraline 75 milliGRAM(s) Oral daily      RADIOLOGY, EKG & ADDITIONAL TESTS: Reviewed.

## 2023-12-06 NOTE — PROGRESS NOTE ADULT - NSPROGADDITIONALINFOA_GEN_ALL_CORE
.  .  Endocrine Surgery Attending Statement    Patient seen and examined with the surgical resident.  She states that she feels well and denies pain, dysphagia, voice changes, or paresthesias.  Her voice is strong and clear.  The neck is flat and the incision is clean with intact Steri-Strips.  The drain had a small amount of serosanguinous fluid and was subsequently removed.  A clean dressing was applied to the drain site.  AM serum calcium = 9.7.  Continue current regimen of PO calcium supplementation.  She is stable for discharge home today.  Post-op instructions and follow-up information were reviewed with the patient.

## 2023-12-06 NOTE — DISCHARGE NOTE PROVIDER - NSDCCPCAREPLAN_GEN_ALL_CORE_FT
PRINCIPAL DISCHARGE DIAGNOSIS  Diagnosis: Multinodular goiter  Assessment and Plan of Treatment:

## 2023-12-06 NOTE — DISCHARGE NOTE NURSING/CASE MANAGEMENT/SOCIAL WORK - NSDCPEFALRISK_GEN_ALL_CORE
For information on Fall & Injury Prevention, visit: [email or web address] OR  [email or web address] OR  https://www.cdc.gov/steadi/patient.html For information on Fall & Injury Prevention, visit: [email or web address] OR  [email or web address] OR  https://www.cdc.gov/steadi/patient.html

## 2023-12-06 NOTE — DISCHARGE NOTE PROVIDER - NSDCFUSCHEDAPPT_GEN_ALL_CORE_FT
Tex Garcia  [facility]  [address]  Scheduled Appointment: 12/13/2023     Tex Garcia  [facility]  [address]  Scheduled Appointment: 12/13/2023

## 2023-12-06 NOTE — DISCHARGE NOTE PROVIDER - NSDCMRMEDTOKEN_GEN_ALL_CORE_FT
amLODIPine 5 mg oral tablet: 1 tab(s) orally once a day  cinacalcet 30 mg oral tablet: 1 tab(s) orally once a day  lacosamide 50 mg oral tablet: 1 tab(s) orally 2 times a day start tonight 11/28/23 MDD: 2 tabs  Latuda: 40 milligram(s) orally once a day  lithium 300 mg oral tablet: 3 tab(s) orally once a day (at bedtime)  sertraline: 75 milligram(s) orally once a day   amLODIPine 5 mg oral tablet: 1 tab(s) orally once a day  calcium carbonate 1250 mg (500 mg elemental calcium) oral tablet: 1 tab(s) orally 2 times a day  cinacalcet 30 mg oral tablet: 1 tab(s) orally once a day  lacosamide 50 mg oral tablet: 1 tab(s) orally 2 times a day start tonight 11/28/23 MDD: 2 tabs  Latuda: 40 milligram(s) orally once a day  lithium 300 mg oral tablet: 3 tab(s) orally once a day (at bedtime)  sertraline: 75 milligram(s) orally once a day

## 2023-12-06 NOTE — DISCHARGE NOTE PROVIDER - CARE PROVIDERS DIRECT ADDRESSES
,[email or web address]

## 2023-12-06 NOTE — PATIENT PROFILE ADULT - FALL HARM RISK - UNIVERSAL INTERVENTIONS
Bed in lowest position, wheels locked, appropriate side rails in place/Call bell, personal items and telephone in reach/Instruct patient to call for assistance before getting out of bed or chair/Non-slip footwear when patient is out of bed/[place] to call system/Physically safe environment - no spills, clutter or unnecessary equipment/Purposeful Proactive Rounding/Room/bathroom lighting operational, light cord in reach Bed in lowest position, wheels locked, appropriate side rails in place/Call bell, personal items and telephone in reach/Instruct patient to call for assistance before getting out of bed or chair/Non-slip footwear when patient is out of bed/[place] to call system/Physically safe environment - no spills, clutter or unnecessary equipment/Purposeful Proactive Rounding/Room/bathroom lighting operational, light cord in reach

## 2023-12-06 NOTE — DISCHARGE NOTE PROVIDER - NSDCFUADDINST_GEN_ALL_CORE_FT
WOUND CARE:  Please keep incisions clean and dry. Please do not Scrub or rub incisions. Do not use lotion or powder on incisions.   BATHING: You may shower and/or sponge bathe. You may use warm soapy water in the shower and rinse, pat dry.  ACTIVITY: No heavy lifting or straining. Otherwise, you may return to your usual level of physical activity. If you are taking narcotic pain medication DO NOT drive a car, operate machinery or make important decisions.  DIET: Return to your usual diet.  NOTIFY YOUR SURGEON IF YOU HAVE: any bleeding that does not stop, any pus draining from your wound(s), any fever (over 100.4 F) persistent nausea/vomiting, or if your pain is not controlled on your discharge pain medications, unable to urinate.  Please follow up with your primary care physician in one week regarding your hospitalization, bring copies of your discharge paperwork.  Please follow up with your surgeon, Dr. Garcia

## 2023-12-08 PROBLEM — F31.9 BIPOLAR DISORDER, UNSPECIFIED: Chronic | Status: ACTIVE | Noted: 2023-11-29

## 2023-12-08 PROBLEM — I10 ESSENTIAL (PRIMARY) HYPERTENSION: Chronic | Status: ACTIVE | Noted: 2023-11-29

## 2023-12-08 PROBLEM — G40.909 EPILEPSY, UNSPECIFIED, NOT INTRACTABLE, WITHOUT STATUS EPILEPTICUS: Chronic | Status: ACTIVE | Noted: 2023-11-29

## 2023-12-13 ENCOUNTER — APPOINTMENT (OUTPATIENT)
Dept: SURGERY | Facility: CLINIC | Age: 48
End: 2023-12-13
Payer: COMMERCIAL

## 2023-12-13 PROCEDURE — 99024 POSTOP FOLLOW-UP VISIT: CPT

## 2023-12-13 RX ORDER — CINACALCET 30 MG/1
30 TABLET ORAL
Refills: 0 | Status: DISCONTINUED | COMMUNITY
End: 2023-12-13

## 2023-12-13 NOTE — HISTORY OF PRESENT ILLNESS
[de-identified] : Mrs. Chavis presents for post-operative follow-up, status post left thyroid lobectomy and trans-cervical partial thymectomy with excision of intra-thymic parathyroid lesion on 12/05/2023. She states that she has been doing well and denies pain, dysphagia, or voice changes.  She has been taking 600 mg of calcium BID and denies any paresthesias.

## 2023-12-13 NOTE — CONSULT LETTER
[Dear  ___] : Dear  [unfilled], [FreeTextEntry1] : I would like to update you on your patient, Daxa Chavis.  Please see my full office note below and feel free to contact me with any questions.  Thank you for allowing me to participate in the care of your patient.  Sincerely,  Tex Garcia MD, FACS Chief of Endocrine Surgery, [facility] Assistant Professor of Surgery, [employer] [facility] [employer] Division of Endocrine Surgery, Department of Surgery [address] [address] [phone] [email or web address]

## 2023-12-13 NOTE — PHYSICAL EXAM
[Midline] : located in midline position [Normal] : orientation to person, place, and time: normal [de-identified] : There is trace soft fullness above the incision c/w a small seroma.  The incision and drain site are clean and healing well.  The Steri-strips were changed. [de-identified] : Extremities: FITZPATRICK x 4.   Skin: No obvious skin lesions.   Voice: clear

## 2023-12-13 NOTE — ASSESSMENT
[FreeTextEntry1] : Assessment:  48-year-old woman, with history significant for Lithium use, mild renal insufficiency, and right thyroid nodule, presents for post-op follow-up status post left thyroid lobectomy and trans-cervical partial thymectomy with excision of intra-thymic parathyroid lesion.  Plan: - The surgical pathology is still pending.  I will contact her once the report has been finalized to review the results.  Provided that her pathology is benign, will plan for continued observation of her right thyroid nodules. - I instructed her to keep the incision covered and change the Steri-Strips as necessary for a total of 1-month post-op.  Methods for optimal scar care were reviewed.  I reassured her that her small wound seroma will resolve spontaneously over the next few weeks. - She was instructed how to taper down her calcium supplementation over the next several weeks. - She will be following up with her endocrinologist, Dr. Mart within the next several weeks to have routine post-operative labs and was instructed to follow-up with me at six-months post-op or in the interim PRN.

## 2024-01-03 ENCOUNTER — APPOINTMENT (OUTPATIENT)
Dept: NEUROLOGY | Facility: CLINIC | Age: 49
End: 2024-01-03
Payer: COMMERCIAL

## 2024-01-03 VITALS
BODY MASS INDEX: 31.16 KG/M2 | SYSTOLIC BLOOD PRESSURE: 145 MMHG | TEMPERATURE: 98 F | HEIGHT: 65 IN | HEART RATE: 63 BPM | OXYGEN SATURATION: 97 % | DIASTOLIC BLOOD PRESSURE: 84 MMHG | WEIGHT: 187 LBS

## 2024-01-03 DIAGNOSIS — R56.9 UNSPECIFIED CONVULSIONS: ICD-10-CM

## 2024-01-03 PROCEDURE — 99204 OFFICE O/P NEW MOD 45 MIN: CPT

## 2024-01-03 PROCEDURE — 99214 OFFICE O/P EST MOD 30 MIN: CPT

## 2024-01-03 RX ORDER — LACOSAMIDE 100 MG/1
100 TABLET ORAL
Qty: 180 | Refills: 1 | Status: ACTIVE | COMMUNITY
Start: 2024-01-03 | End: 1900-01-01

## 2024-01-03 RX ORDER — LOSARTAN POTASSIUM 50 MG/1
50 TABLET, FILM COATED ORAL DAILY
Refills: 0 | Status: ACTIVE | COMMUNITY

## 2024-01-03 NOTE — PHYSICAL EXAM
[FreeTextEntry1] : MS: alert & oriented to person, place time, good fund of knowledge and recall for recent and remote events.  CN: VFF to confrontation, EOM full without nystagmus, PERRL, V1-V3 intact to light touch, face symmetrical, hears finger rub bilaterally, palate elevates symmetrically, shoulders elevate symmetrically, tongue midline MOTOR: normal tone x 4 extremities, Power 5/5 proximally and distally bilaterally, no drift, normal rapid alternating movements.  SENSORY: intact LT x 4 extremities REFLEXES: biceps 2+ bilaterally, triceps 2+ bilaterally, brachioradialis 2+ bilaterally, patella 2+ bilaterally, ankle 2+ bilaterally, plantar flexor bilaterally COORD: normal FNF, fine tremor with arm extension bilaterally, no dysmetria GAIT: normal base, Romberg negative, normal gait, able to walk on toes and tandem.  Constitutional: alert and in no acute distress.  Psychiatric: oriented to person, place, and time, insight and judgment were intact and the affect was normal.  Eyes: extraocular movements were intact.  ENT: hearing was normal.  Neck: the appearance of the neck was normal.  Pulmonary: no respiratory distress.  Vascular: there was no peripheral edema.  Abdomen: soft.  Musculoskeletal: normal gait.  Skin: normal skin color and pigmentation.

## 2024-01-03 NOTE — HISTORY OF PRESENT ILLNESS
[FreeTextEntry1] : *** 01/03/2024  ***  Ms. BUI presents for follow-up after seizure flurry on 11-. Recent history also complicated by parathyroid surgery (after seizure) and diagnosis of invasive cancer with recommendation of complete thyroidectomy and adjuvant treatment. Seizure history as noted. First experience seizures in college - infrequent - usually 1/year - never took ASM.  Seizure heralded by aura of funny feeling and change in hearing lasting less than a minute, more than a couple of seconds, followed by stiffening and loss of awareness. On most recent occasion, patient fell.   She has not sought medical care for this problem because seizures have been infrequent. She has taken lamotrigine in past for psychiatric benefit, and noted that seizures did not occur during time she was taking lamotrigine.   *** from consult note 11- ***  Reason for Admission	Recurrent Seizures HPI:  The patient is a 48y Female who presented with 3 seizures at home and one after coming to the ED.  she says that since college she has had seizures, on average once per year.  they consist of her tensing up and staring with unresponsiveness.  they last about 30 seconds.  She can only remember when she comes out of it.  Yesterday she had urinary incontinence with this and she fell and hit her head on a chair and suffered an tongue laceration. She has one drink daily and has never had alcohol withdrawal seizures.  Neurology is asked to evaluate. Interval history: no events overnight, Review of systems (neurology): Denies headache or dizziness. Denies weakness/numbness.  Denies speech/language deficits. Denies diplopia/blurred vision.  Denies confusion   MEDICATIONS  (STANDING): amLODIPine   Tablet 5 milliGRAM(s) Oral daily enoxaparin Injectable 40 milliGRAM(s) SubCutaneous every 24 hours lacosamide Injectable 200 milliGRAM(s) IV Push once lithium 900 milliGRAM(s) Oral at bedtime lurasidone 40 milliGRAM(s) Oral daily sertraline Concentrate 75 milliGRAM(s) Oral daily   MEDICATIONS  (PRN): acetaminophen     Tablet .. 650 milliGRAM(s) Oral every 6 hours PRN Temp greater or equal to 38C (100.4F), Mild Pain (1 - 3) aluminum hydroxide/magnesium hydroxide/simethicone Suspension 30 milliLiter(s) Oral every 4 hours PRN Dyspepsia melatonin 3 milliGRAM(s) Oral at bedtime PRN Insomnia  ondansetron Injectable 4 milliGRAM(s) IV Push every 8 hours PRN Nausea and/or Vomiting Detailed Neurologic Exam: Mental status: The patient is awake and alert and has normal attention span. The patient is fully oriented in 3 spheres. The patient is oriented to current events. The patient is able to name objects, follow commands, repeat sentences. Cranial nerves: Pupils equal and react symmetrically to light. There is no visual field deficit to confrontation. Extraocular motion is full with no nystagmus. There is no ptosis. Facial sensation is intact. Facial musculature is symmetric. Palate elevates symmetrically. Tongue is midline. Motor: There is normal bulk and tone.  There is no tremor. Strength is 5/5 in the right arm and leg. Strength is 5/5 in the left arm and leg. Sensation: Intact to light touch and pin in 4 extremities Cerebellar: There is no dysmetria on finger to nose testing. Gait : deferred   EEG Classification / Summary 11/27-11/28/23: Abnormal EEG in the awake, drowsy, and asleep states. Occasional bilateral independent frontotemporal focal slowing. No epileptiform abnormalities are captured. Clinical Impression: Bilateral frontotemporal focal cerebral dysfunction can be structural or functional in etiology.  -------------------------------------------------------------------------------- EEG SUMMARY 11/26-11/27/23: Abnormal EEG in the awake, drowsy and asleep states. 1.	Generalized rhythmical delta activity (GRDA) intermittently IMPRESSION/CLINICAL CORRELATE: 11/26/23 ? 11/27/23: no event or seizure Interictal findings suggest Mild diffuse cerebral dysfunction, not specific for etiology.  RADIOLOGY & ADDITIONAL STUDIES (independently reviewed unless otherwise noted): CT Head No Cont (11.26.23 @ 00:26) CT HEAD: No acute intracranial hemorrhage, mass effect, or osseous fracture.  Assessment and Plan: The patient is a 48y Female who is followed by neurology because of seizure. She has a loss of responsiveness with generalized tonic contraction and incontinence.  They usually happen once per year and often in setting of migraine.   Seizure - Continuous video EEG with EMU monitoring to better capture and classify seizure activity to choose most appropriate anti-seizure for her. Seizure precautions EEG did not show seizure, d/c EEG however given her history I am concerned for epilepsy I will load vimpat 200 mg IV the start vimpat 50 mg po bid at home, titer if needed a given loss of responsiveness during these episode she cannot operate a motor vehicle until cleared by eurology/epilepsy, will need stability on anti-seizure medication for 6 months, patient was advised of this   Neuro stable for discharge when medically stable Jozef Walls MD, PhD

## 2024-01-16 ENCOUNTER — OUTPATIENT (OUTPATIENT)
Dept: OUTPATIENT SERVICES | Facility: HOSPITAL | Age: 49
LOS: 1 days | End: 2024-01-16

## 2024-01-16 ENCOUNTER — APPOINTMENT (OUTPATIENT)
Dept: MRI IMAGING | Facility: CLINIC | Age: 49
End: 2024-01-16
Payer: COMMERCIAL

## 2024-01-16 ENCOUNTER — RESULT REVIEW (OUTPATIENT)
Age: 49
End: 2024-01-16

## 2024-01-16 DIAGNOSIS — Z00.8 ENCOUNTER FOR OTHER GENERAL EXAMINATION: ICD-10-CM

## 2024-01-16 DIAGNOSIS — Z98.890 OTHER SPECIFIED POSTPROCEDURAL STATES: Chronic | ICD-10-CM

## 2024-01-16 PROCEDURE — 76377 3D RENDER W/INTRP POSTPROCES: CPT | Mod: 26

## 2024-01-16 PROCEDURE — 70551 MRI BRAIN STEM W/O DYE: CPT | Mod: 26

## 2024-01-24 ENCOUNTER — TRANSCRIPTION ENCOUNTER (OUTPATIENT)
Age: 49
End: 2024-01-24

## 2024-02-19 NOTE — H&P ADULT - PATIENT ON (OXYGEN DELIVERY METHOD)
Not on any statin therapy.  She does not want to take any more medications.  Her last lipids were in 2022    room air

## 2024-03-01 DIAGNOSIS — Z12.39 ENCOUNTER FOR OTHER SCREENING FOR MALIGNANT NEOPLASM OF BREAST: ICD-10-CM

## 2024-03-01 DIAGNOSIS — Z01.419 ENCOUNTER FOR GYNECOLOGICAL EXAMINATION (GENERAL) (ROUTINE) W/OUT ABNORMAL FINDINGS: ICD-10-CM

## 2024-03-04 ENCOUNTER — APPOINTMENT (OUTPATIENT)
Dept: OBGYN | Facility: CLINIC | Age: 49
End: 2024-03-04
Payer: COMMERCIAL

## 2024-03-04 VITALS
SYSTOLIC BLOOD PRESSURE: 138 MMHG | WEIGHT: 189 LBS | BODY MASS INDEX: 31.49 KG/M2 | HEIGHT: 65 IN | DIASTOLIC BLOOD PRESSURE: 86 MMHG

## 2024-03-04 DIAGNOSIS — Z00.00 ENCOUNTER FOR GENERAL ADULT MEDICAL EXAMINATION W/OUT ABNORMAL FINDINGS: ICD-10-CM

## 2024-03-04 PROCEDURE — 99396 PREV VISIT EST AGE 40-64: CPT

## 2024-03-04 NOTE — PHYSICAL EXAM
[Appropriately responsive] : appropriately responsive [Alert] : alert [No Acute Distress] : no acute distress [No Lymphadenopathy] : no lymphadenopathy [Regular Rate Rhythm] : regular rate rhythm [No Murmurs] : no murmurs [Soft] : soft [Clear to Auscultation B/L] : clear to auscultation bilaterally [Non-tender] : non-tender [Non-distended] : non-distended [No HSM] : No HSM [No Lesions] : no lesions [Oriented x3] : oriented x3 [No Mass] : no mass [Examination Of The Breasts] : a normal appearance [No Masses] : no breast masses were palpable [Labia Majora] : normal [Labia Minora] : normal [Normal] : normal [Uterine Adnexae] : normal

## 2024-03-04 NOTE — HISTORY OF PRESENT ILLNESS
[FreeTextEntry1] : 49 yo p3 here for annual exam. dxd thyroid ca, had half lobe out, going for full rmoval. lost voice 3 weeks after surg.  no periods since ablation. denies hot flashes , night sweats.  m - had br ca at 45,  of lung ca , had radiation for lymphoma non-hodgkins, cancers thought to be related to post rt.

## 2024-03-07 LAB — HPV HIGH+LOW RISK DNA PNL CVX: NOT DETECTED

## 2024-03-08 ENCOUNTER — OUTPATIENT (OUTPATIENT)
Dept: OUTPATIENT SERVICES | Facility: HOSPITAL | Age: 49
LOS: 1 days | End: 2024-03-08
Payer: COMMERCIAL

## 2024-03-08 VITALS
HEART RATE: 78 BPM | HEIGHT: 65 IN | WEIGHT: 190.04 LBS | RESPIRATION RATE: 18 BRPM | OXYGEN SATURATION: 98 % | TEMPERATURE: 98 F | SYSTOLIC BLOOD PRESSURE: 147 MMHG | DIASTOLIC BLOOD PRESSURE: 98 MMHG

## 2024-03-08 DIAGNOSIS — G40.909 EPILEPSY, UNSPECIFIED, NOT INTRACTABLE, WITHOUT STATUS EPILEPTICUS: ICD-10-CM

## 2024-03-08 DIAGNOSIS — Z01.818 ENCOUNTER FOR OTHER PREPROCEDURAL EXAMINATION: ICD-10-CM

## 2024-03-08 DIAGNOSIS — E04.2 NONTOXIC MULTINODULAR GOITER: ICD-10-CM

## 2024-03-08 DIAGNOSIS — Z98.890 OTHER SPECIFIED POSTPROCEDURAL STATES: Chronic | ICD-10-CM

## 2024-03-08 DIAGNOSIS — E89.0 POSTPROCEDURAL HYPOTHYROIDISM: Chronic | ICD-10-CM

## 2024-03-08 LAB
ALBUMIN SERPL ELPH-MCNC: 3.9 G/DL — SIGNIFICANT CHANGE UP (ref 3.3–5)
ALP SERPL-CCNC: 62 U/L — SIGNIFICANT CHANGE UP (ref 40–120)
ALT FLD-CCNC: 21 U/L — SIGNIFICANT CHANGE UP (ref 12–78)
ANION GAP SERPL CALC-SCNC: 9 MMOL/L — SIGNIFICANT CHANGE UP (ref 5–17)
APTT BLD: 31.9 SEC — SIGNIFICANT CHANGE UP (ref 24.5–35.6)
AST SERPL-CCNC: 16 U/L — SIGNIFICANT CHANGE UP (ref 15–37)
BILIRUB SERPL-MCNC: 0.4 MG/DL — SIGNIFICANT CHANGE UP (ref 0.2–1.2)
BUN SERPL-MCNC: 18 MG/DL — SIGNIFICANT CHANGE UP (ref 7–23)
CALCIUM SERPL-MCNC: 9.6 MG/DL — SIGNIFICANT CHANGE UP (ref 8.5–10.1)
CHLORIDE SERPL-SCNC: 109 MMOL/L — HIGH (ref 96–108)
CO2 SERPL-SCNC: 25 MMOL/L — SIGNIFICANT CHANGE UP (ref 22–31)
CREAT SERPL-MCNC: 1.2 MG/DL — SIGNIFICANT CHANGE UP (ref 0.5–1.3)
EGFR: 55 ML/MIN/1.73M2 — LOW
GLUCOSE SERPL-MCNC: 94 MG/DL — SIGNIFICANT CHANGE UP (ref 70–99)
HCT VFR BLD CALC: 46.5 % — HIGH (ref 34.5–45)
HGB BLD-MCNC: 15.1 G/DL — SIGNIFICANT CHANGE UP (ref 11.5–15.5)
INR BLD: 0.86 RATIO — SIGNIFICANT CHANGE UP (ref 0.85–1.18)
LITHIUM SERPL-MCNC: 0.6 MMOL/L — SIGNIFICANT CHANGE UP (ref 0.6–1.2)
MCHC RBC-ENTMCNC: 31.9 PG — SIGNIFICANT CHANGE UP (ref 27–34)
MCHC RBC-ENTMCNC: 32.5 GM/DL — SIGNIFICANT CHANGE UP (ref 32–36)
MCV RBC AUTO: 98.1 FL — SIGNIFICANT CHANGE UP (ref 80–100)
NRBC # BLD: 0 /100 WBCS — SIGNIFICANT CHANGE UP (ref 0–0)
PLATELET # BLD AUTO: 240 K/UL — SIGNIFICANT CHANGE UP (ref 150–400)
POTASSIUM SERPL-MCNC: 4.2 MMOL/L — SIGNIFICANT CHANGE UP (ref 3.5–5.3)
POTASSIUM SERPL-SCNC: 4.2 MMOL/L — SIGNIFICANT CHANGE UP (ref 3.5–5.3)
PROT SERPL-MCNC: 7.6 G/DL — SIGNIFICANT CHANGE UP (ref 6–8.3)
PROTHROM AB SERPL-ACNC: 10.1 SEC — SIGNIFICANT CHANGE UP (ref 9.5–13)
RBC # BLD: 4.74 M/UL — SIGNIFICANT CHANGE UP (ref 3.8–5.2)
RBC # FLD: 12.1 % — SIGNIFICANT CHANGE UP (ref 10.3–14.5)
SODIUM SERPL-SCNC: 143 MMOL/L — SIGNIFICANT CHANGE UP (ref 135–145)
WBC # BLD: 5.08 K/UL — SIGNIFICANT CHANGE UP (ref 3.8–10.5)
WBC # FLD AUTO: 5.08 K/UL — SIGNIFICANT CHANGE UP (ref 3.8–10.5)

## 2024-03-08 PROCEDURE — G0463: CPT

## 2024-03-08 PROCEDURE — 80178 ASSAY OF LITHIUM: CPT

## 2024-03-08 PROCEDURE — 86850 RBC ANTIBODY SCREEN: CPT

## 2024-03-08 PROCEDURE — 85730 THROMBOPLASTIN TIME PARTIAL: CPT

## 2024-03-08 PROCEDURE — 36415 COLL VENOUS BLD VENIPUNCTURE: CPT

## 2024-03-08 PROCEDURE — 85027 COMPLETE CBC AUTOMATED: CPT

## 2024-03-08 PROCEDURE — 85610 PROTHROMBIN TIME: CPT

## 2024-03-08 PROCEDURE — 80053 COMPREHEN METABOLIC PANEL: CPT

## 2024-03-08 PROCEDURE — 86901 BLOOD TYPING SEROLOGIC RH(D): CPT

## 2024-03-08 PROCEDURE — 86900 BLOOD TYPING SEROLOGIC ABO: CPT

## 2024-03-08 RX ORDER — CINACALCET 30 MG/1
1 TABLET, FILM COATED ORAL
Refills: 0 | DISCHARGE

## 2024-03-08 NOTE — H&P PST ADULT - HISTORY OF PRESENT ILLNESS
This is a 1 year old alert and oriented male/female, no significant PMH OR PMH significant for presenting with the diagnosis of ** here today for pre surgical testing.   He or She is scheduled for Total Partial Thyroidectomy- Poss central neck dissection[facility].    Reports going to surgeon for initial evaluation for thyroid nodules and warranted further w/u which included biopsy, MRI, Ultrasound and after discussing the treatment options with the surgeon elected for  the above mentioned surgery.    Denies any voice changes , sob or difficulty swallowing at this time.   This is a 48 year old alert and oriented female, PMH significant for epilepsy- last seizure 11/25/23- On Vimpat bid, Hypertension, bipolar depression on Lithium  here today for pre surgical testing.  She is scheduled for Completion Thyroidectomy- [facility] with Dr. Garcia on 3/19/2024.    Patient had a partial thyroidectomy in december 2023 but after the pathology results were back  and discussing the treatment options with the surgeon elected for the above mentioned surgery.    Denies any voice changes , sob or difficulty swallowing at this time.

## 2024-03-08 NOTE — H&P PST ADULT - NSICDXPASTMEDICALHX_GEN_ALL_CORE_FT
PAST MEDICAL HISTORY:  Bipolar depression     Hypertension     Nontoxic multinodular goiter     Seizure disorder

## 2024-03-08 NOTE — H&P PST ADULT - PROBLEM SELECTOR PLAN 3
* Following labs ordered-            CBC, CMP, Type and screen,  PT, PTT, INR  * Going for medical clearance with Dr. Nava on 3/19/2024.  * Stop any herbal remedies, vitamin supplements or any medication that contains Aspirin , Ibuprofen, Advil, Motrin or Aleve at least 7 days before surgery.  * Leave all valuables at home, No lotion or eye makeup on the day of surgery and  No illegal drugs for 7 days prior to surgery and no alcohol 24 hrs before procedure.  * Preop instructions explained. Verbalized understanding.   * Please take the following meds on the day of surgery with sips of water- Vimpat  * Hibiclens scrub explained and provided.

## 2024-03-08 NOTE — H&P PST ADULT - ATTENDING COMMENTS
The patient states that she developed hoarseness 1-month after her first surgery which is slowly improving, however has been persistent.  On exam, she is audibly hoarse.  I have recommended that she be evaluated by laryngology prior to proceeding with the planned completion thyroidectomy.  She was provided with the name and contact information for Dr. Vincent.  Her surgery has been tentatively rescheduled for 4/12. The patient states that she developed hoarseness 1-month after her first surgery which is slowly improving, however has been persistent.  On exam, she is audibly hoarse.  I have recommended that she be evaluated by laryngology prior to proceeding with the planned completion thyroidectomy.  She was provided with the name and contact information for Dr. Vincent.  Her surgery has been tentatively rescheduled for 4/12.    04/12/2024 - Above reviewed.  She was evaluated by Dr. 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  She represents today for an elective completion thyroidectomy.

## 2024-03-08 NOTE — H&P PST ADULT - ASSESSMENT
This is a 48 year old alert and oriented female, PMH significant for epilepsy- last seizure 11/25/23- On Vimpat bid, Hypertension, bipolar depression on Lithium  here today for pre surgical testing.  She is scheduled for Completion Thyroidectomy- [facility] with Dr. Garcia on 3/19/2024.

## 2024-03-18 ENCOUNTER — NON-APPOINTMENT (OUTPATIENT)
Age: 49
End: 2024-03-18

## 2024-03-19 DIAGNOSIS — R49.0 DYSPHONIA: ICD-10-CM

## 2024-03-21 LAB — CYTOLOGY CVX/VAG DOC THIN PREP: NORMAL

## 2024-03-25 ENCOUNTER — NON-APPOINTMENT (OUTPATIENT)
Age: 49
End: 2024-03-25

## 2024-03-25 ENCOUNTER — RESULT REVIEW (OUTPATIENT)
Age: 49
End: 2024-03-25

## 2024-03-26 ENCOUNTER — NON-APPOINTMENT (OUTPATIENT)
Age: 49
End: 2024-03-26

## 2024-03-26 ENCOUNTER — APPOINTMENT (OUTPATIENT)
Dept: OTOLARYNGOLOGY | Facility: CLINIC | Age: 49
End: 2024-03-26
Payer: COMMERCIAL

## 2024-03-26 VITALS
BODY MASS INDEX: 31.65 KG/M2 | HEART RATE: 76 BPM | HEIGHT: 65 IN | WEIGHT: 190 LBS | SYSTOLIC BLOOD PRESSURE: 161 MMHG | DIASTOLIC BLOOD PRESSURE: 96 MMHG

## 2024-03-26 DIAGNOSIS — Z78.9 OTHER SPECIFIED HEALTH STATUS: ICD-10-CM

## 2024-03-26 DIAGNOSIS — J38.01 PARALYSIS OF VOCAL CORDS AND LARYNX, UNILATERAL: ICD-10-CM

## 2024-03-26 DIAGNOSIS — R49.0 DYSPHONIA: ICD-10-CM

## 2024-03-26 PROBLEM — E04.2 NONTOXIC MULTINODULAR GOITER: Chronic | Status: ACTIVE | Noted: 2024-03-08

## 2024-03-26 PROCEDURE — 31579 LARYNGOSCOPY TELESCOPIC: CPT

## 2024-03-26 PROCEDURE — 99203 OFFICE O/P NEW LOW 30 MIN: CPT | Mod: 25

## 2024-03-26 RX ORDER — PNV NO.95/FERROUS FUM/FOLIC AC 28MG-0.8MG
TABLET ORAL
Refills: 0 | Status: ACTIVE | COMMUNITY

## 2024-03-26 RX ORDER — CHROMIUM 200 MCG
TABLET ORAL
Refills: 0 | Status: ACTIVE | COMMUNITY

## 2024-03-26 NOTE — PROCEDURE
[de-identified] : Stroboscopic Laryngoscopy Procedure Note:  Indication:	Assess laryngeal biomechanics and vocal fold oscillation.  Description of Procedure:	Informed consent was verbally obtained from the patient prior to the procedure. The patient was seated in the clinic chair. Topical anesthesia was achieved by first spraying the nasal cavities with 4% lidocaine and nasal decongestant.   Findings:  Supraglottis: no masses or lesions  Glottis:    Structure:                        Right: crisp and shows no lesions or masses                        Left:  crisp and shows no lesions or masses                 Mobility:                        Right:  normal                        Left:  severe hypomobility                Amplitude:                        Right:  normal                       Left:  normal                Closure: complete                 Wave symmetry:  symmetric  Subglottis: no masses or lesions within the visualized subglottis Visualized airway is widely patent.

## 2024-03-26 NOTE — HISTORY OF PRESENT ILLNESS
[de-identified] : CORINNA BUI is a 48 year old woman who presents to the [facility] with difficulty phonating. Reports this started early Jan 2024, approx 1 month after her prior hemithyroidectomy.  Since then has been gradually improving.  Was scheduled for completion thyroidectomy in mid March 2024 but was cancelled when patient reported this history of dysphonia. Currently tentatively scheduled for 4/12/24. She is referred by Dr. Tex Garcia. She does NOT note periods of normal voicing. She does NOT note specific difficulties with swallowing. She does NOT note frequent classic heartburn symptoms. VOCAL DEMANDS: Her vocal demands are primarily those of work (, and has 3 kids). PMH: HTN, Bipolar, "kidney disease"  Prior Pertinent Procedures: 12/5/23: Left hemithyroidectomy; Dr. Garcia

## 2024-03-26 NOTE — ASSESSMENT
[FreeTextEntry1] : Assessment/Plan:  #1 Left severe hypomobility  #2 Dysphonia #3 Thyroid cancer  As issue happened 1 month post op we will call it idiopathic vocal fold paralysis with recovery now to a paresis (severe).  I expect she will regain full motion as we already see motion.  Recommended we proceed with thyroid completion surgery with Dr. Garcia on 4/12/24 and see me back approx 3 weeks after that.  If still having voice issues and hypomobility seen on left then I would recommend we proceed with in office injection laryngoplasty the following week.

## 2024-03-26 NOTE — PHYSICAL EXAM
[de-identified] : well healed incision [Normal] : mucosa is normal [Midline] : trachea located in midline position

## 2024-03-26 NOTE — CONSULT LETTER
[Dear  ___] : Dear  [unfilled], [Consult Letter:] : I had the pleasure of evaluating your patient, [unfilled]. [Consult Closing:] : Thank you very much for allowing me to participate in the care of this patient.  If you have any questions, please do not hesitate to contact me. [Please see my note below.] : Please see my note below. [Sincerely,] : Sincerely, [FreeTextEntry2] : Dr. Tex Garcia [FreeTextEntry3] : Oleg Vincent M.D. Division of Laryngology | Department of Otolaryngology  [address] [address]

## 2024-03-26 NOTE — REASON FOR VISIT
[Initial Consultation] : an initial consultation for [FreeTextEntry2] : referred by Dr.Justin Garcai for dysphonia. dysphonia

## 2024-04-05 ENCOUNTER — APPOINTMENT (OUTPATIENT)
Dept: NEUROLOGY | Facility: CLINIC | Age: 49
End: 2024-04-05
Payer: COMMERCIAL

## 2024-04-05 VITALS
OXYGEN SATURATION: 98 % | HEIGHT: 65 IN | DIASTOLIC BLOOD PRESSURE: 102 MMHG | WEIGHT: 186 LBS | SYSTOLIC BLOOD PRESSURE: 142 MMHG | HEART RATE: 60 BPM | BODY MASS INDEX: 30.99 KG/M2

## 2024-04-05 DIAGNOSIS — G40.109 LOCALIZATION-RELATED (FOCAL) (PARTIAL) SYMPTOMATIC EPILEPSY AND EPILEPTIC SYNDROMES WITH SIMPLE PARTIAL SEIZURES, NOT INTRACTABLE, W/OUT STATUS EPILEPTICUS: ICD-10-CM

## 2024-04-05 PROCEDURE — 99213 OFFICE O/P EST LOW 20 MIN: CPT

## 2024-04-05 RX ORDER — LACOSAMIDE 50 MG/1
50 TABLET ORAL
Qty: 60 | Refills: 1 | Status: COMPLETED | COMMUNITY
Start: 2023-12-20 | End: 2024-04-05

## 2024-04-05 NOTE — ASSESSMENT
[FreeTextEntry1] : Ms. BUI is a 47 yo F with h/o seizures since ~19 yo. She has aura suggesting focal onset. Prior expeience of sizure suppression while taking lamotrigine support presence of epileptic seizures.  She is tolerating lacosamide 100 q12 - there is some uncertainty about her dose that we need to clarify. Ms. BUI reports she is taking lacosamide 200 q12.  ADDENDUM- Ms. Bui checked and she is taking lacosamide 100 q12  Plan 1. continue lacosamide 100 mg q12   2. RTC 6 or 12 months - patient instructed that if she is otherwise well, without concern, she may return in 12 mo.

## 2024-04-05 NOTE — HISTORY OF PRESENT ILLNESS
[FreeTextEntry1] : *** 04/05/2024  ***  Ms. BUI reports no interval seizures. She reports that she is taking lacosamide 200 q12, but I've prescribed 100 q12 - so we need to clarify dose (ADDENDUM- Ms. Bui checked and she is taking lacosamide 100 q12). She states that prior to seizure flurry in Nov 2023 she had a seizure every 1-2 years. Seizure would be preceded by characteristic aura of "strange feeling of pressure" on her ears.  The aura lasted about 30 seconds and is always followed by seizure.  She has never had aura without seizure.  Seizures are described as unresponsiveness with tensing, sometimes convulsion with tongue bite.  She had MRI recently that was normal to my review and radiology report.   *** 01/03/2024 *** Ms. BUI presents for follow-up after seizure flurry on 11-. Recent history also complicated by parathyroid surgery (after seizure) and diagnosis of invasive cancer with recommendation of complete thyroidectomy and adjuvant treatment. Seizure history as noted. First experience seizures in college - infrequent - usually 1/year - never took ASM. Seizure heralded by aura of funny feeling and change in hearing lasting less than a minute, more than a couple of seconds, followed by stiffening and loss of awareness. On most recent occasion, patient fell. She has not sought medical care for this problem because seizures have been infrequent. She has taken lamotrigine in past for psychiatric benefit, and noted that seizures did not occur during time she was taking lamotrigine.  *** from consult note 11- *** Reason for Admission Recurrent Seizures HPI: The patient is a 48y Female who presented with 3 seizures at home and one after coming to the ED. she says that since college she has had seizures, on average once per year. they consist of her tensing up and staring with unresponsiveness. they last about 30 seconds. She can only remember when she comes out of it. Yesterday she had urinary incontinence with this and she fell and hit her head on a chair and suffered an tongue laceration. She has one drink daily and has never had alcohol withdrawal seizures. Neurology is asked to evaluate. Interval history: no events overnight, Review of systems (neurology): Denies headache or dizziness. Denies weakness/numbness. Denies speech/language deficits. Denies diplopia/blurred vision. Denies confusion  MEDICATIONS (STANDING): amLODIPine Tablet 5 milliGRAM(s) Oral daily enoxaparin Injectable 40 milliGRAM(s) SubCutaneous every 24 hours lacosamide Injectable 200 milliGRAM(s) IV Push once lithium 900 milliGRAM(s) Oral at bedtime lurasidone 40 milliGRAM(s) Oral daily sertraline Concentrate 75 milliGRAM(s) Oral daily  MEDICATIONS (PRN): acetaminophen Tablet .. 650 milliGRAM(s) Oral every 6 hours PRN Temp greater or equal to 38C (100.4F), Mild Pain (1 - 3) aluminum hydroxide/magnesium hydroxide/simethicone Suspension 30 milliLiter(s) Oral every 4 hours PRN Dyspepsia melatonin 3 milliGRAM(s) Oral at bedtime PRN Insomnia ondansetron Injectable 4 milliGRAM(s) IV Push every 8 hours PRN Nausea and/or Vomiting Detailed Neurologic Exam: Mental status: The patient is awake and alert and has normal attention span. The patient is fully oriented in 3 spheres. The patient is oriented to current events. The patient is able to name objects, follow commands, repeat sentences. Cranial nerves: Pupils equal and react symmetrically to light. There is no visual field deficit to confrontation. Extraocular motion is full with no nystagmus. There is no ptosis. Facial sensation is intact. Facial musculature is symmetric. Palate elevates symmetrically. Tongue is midline. Motor: There is normal bulk and tone. There is no tremor. Strength is 5/5 in the right arm and leg. Strength is 5/5 in the left arm and leg. Sensation: Intact to light touch and pin in 4 extremities Cerebellar: There is no dysmetria on finger to nose testing. Gait : deferred  EEG Classification / Summary 11/27-11/28/23: Abnormal EEG in the awake, drowsy, and asleep states. Occasional bilateral independent frontotemporal focal slowing. No epileptiform abnormalities are captured. Clinical Impression: Bilateral frontotemporal focal cerebral dysfunction can be structural or functional in etiology. -------------------------------------------------------------------------------- EEG SUMMARY 11/26-11/27/23: Abnormal EEG in the awake, drowsy and asleep states. 1. Generalized rhythmical delta activity (GRDA) intermittently IMPRESSION/CLINICAL CORRELATE: 11/26/23 ? 11/27/23: no event or seizure Interictal findings suggest Mild diffuse cerebral dysfunction, not specific for etiology.  RADIOLOGY & ADDITIONAL STUDIES (independently reviewed unless otherwise noted): CT Head No Cont (11.26.23 @ 00:26) CT HEAD: No acute intracranial hemorrhage, mass effect, or osseous fracture.  Assessment and Plan: The patient is a 48y Female who is followed by neurology because of seizure. She has a loss of responsiveness with generalized tonic contraction and incontinence. They usually happen once per year and often in setting of migraine.  Seizure - Continuous video EEG with EMU monitoring to better capture and classify seizure activity to choose most appropriate anti-seizure for her. Seizure precautions EEG did not show seizure, d/c EEG however given her history I am concerned for epilepsy I will load vimpat 200 mg IV the start vimpat 50 mg po bid at home, titer if needed a given loss of responsiveness during these episode she cannot operate a motor vehicle until cleared by eurology/epilepsy, will need stability on anti-seizure medication for 6 months, patient was advised of this Neuro stable for discharge when medically stable Jozef Walls MD, PhD

## 2024-04-11 ENCOUNTER — TRANSCRIPTION ENCOUNTER (OUTPATIENT)
Age: 49
End: 2024-04-11

## 2024-04-11 NOTE — ASU PATIENT PROFILE, ADULT - FALL HARM RISK - HARM RISK INTERVENTIONS

## 2024-04-11 NOTE — ASU PATIENT PROFILE, ADULT - NSALCOHOLPROBLEMSRELYN_GEN_A_CORE_SD
extension to 4/5 for improved gait stability.  Pt will tolerate standing x 25 minutes to allow for cooking and cleaning with less frequent seated rest.  Pt will improve score on the Oswestry Low Back Pain Questionnaire to </= 38 % disability, demonstrating improved overall function.    [employer]  Access Code: [email or web address]  URL: [email or web address]  Date: 12/07/2023  Prepared by: Francia Jerome    Exercises  - Seated Slump Neural Tensioner  - 1-2 x daily - 2 sets - 10 reps - 2 hold  - Supine Transversus Abdominis Bracing - Hands on Stomach  - 2 x daily - 1 sets - 30 reps - 5 hold  - Clamshell  - 5 x weekly - 2 sets - 5 reps - 3 hold  - Beginner Bridge  - 5 x weekly - 2 sets - 10 reps - 5 hold  
no

## 2024-04-12 ENCOUNTER — TRANSCRIPTION ENCOUNTER (OUTPATIENT)
Age: 49
End: 2024-04-12

## 2024-04-12 ENCOUNTER — APPOINTMENT (OUTPATIENT)
Dept: SURGERY | Facility: HOSPITAL | Age: 49
End: 2024-04-12

## 2024-04-12 ENCOUNTER — RESULT REVIEW (OUTPATIENT)
Age: 49
End: 2024-04-12

## 2024-04-12 ENCOUNTER — OUTPATIENT (OUTPATIENT)
Dept: OUTPATIENT SERVICES | Facility: HOSPITAL | Age: 49
LOS: 1 days | End: 2024-04-12
Payer: COMMERCIAL

## 2024-04-12 VITALS
OXYGEN SATURATION: 95 % | DIASTOLIC BLOOD PRESSURE: 95 MMHG | HEART RATE: 63 BPM | RESPIRATION RATE: 17 BRPM | SYSTOLIC BLOOD PRESSURE: 139 MMHG | TEMPERATURE: 98 F

## 2024-04-12 DIAGNOSIS — E89.0 POSTPROCEDURAL HYPOTHYROIDISM: Chronic | ICD-10-CM

## 2024-04-12 DIAGNOSIS — E04.2 NONTOXIC MULTINODULAR GOITER: ICD-10-CM

## 2024-04-12 DIAGNOSIS — Z98.890 OTHER SPECIFIED POSTPROCEDURAL STATES: Chronic | ICD-10-CM

## 2024-04-12 PROCEDURE — 88305 TISSUE EXAM BY PATHOLOGIST: CPT | Mod: 26

## 2024-04-12 PROCEDURE — 60500 EXPLORE PARATHYROID GLANDS: CPT | Mod: 59

## 2024-04-12 PROCEDURE — 88331 PATH CONSLTJ SURG 1 BLK 1SPC: CPT | Mod: 26

## 2024-04-12 PROCEDURE — 88307 TISSUE EXAM BY PATHOLOGIST: CPT | Mod: 26

## 2024-04-12 PROCEDURE — 60260 REPEAT THYROID SURGERY: CPT

## 2024-04-12 DEVICE — TUBE EMG NIM TRIVANTAGE 7MM
Type: IMPLANTABLE DEVICE | Status: NON-FUNCTIONAL
Removed: 2024-04-12

## 2024-04-12 DEVICE — SURGICEL 2 X 14"
Type: IMPLANTABLE DEVICE | Status: NON-FUNCTIONAL
Removed: 2024-04-12

## 2024-04-12 RX ORDER — ONDANSETRON 8 MG/1
4 TABLET, FILM COATED ORAL ONCE
Refills: 0 | Status: DISCONTINUED | OUTPATIENT
Start: 2024-04-12 | End: 2024-04-12

## 2024-04-12 RX ORDER — LACOSAMIDE 50 MG/1
1 TABLET ORAL
Refills: 0 | DISCHARGE

## 2024-04-12 RX ORDER — ACETYLCYSTEINE 200 MG/ML
1 VIAL (ML) MISCELLANEOUS
Refills: 0 | DISCHARGE

## 2024-04-12 RX ORDER — AMLODIPINE BESYLATE 2.5 MG/1
1 TABLET ORAL
Qty: 120 | Refills: 0
Start: 2024-04-12

## 2024-04-12 RX ORDER — AMLODIPINE BESYLATE 2.5 MG/1
1 TABLET ORAL
Refills: 0 | DISCHARGE

## 2024-04-12 RX ORDER — LEVOTHYROXINE SODIUM 125 MCG
150 TABLET ORAL DAILY
Refills: 0 | Status: DISCONTINUED | OUTPATIENT
Start: 2024-04-12 | End: 2024-04-26

## 2024-04-12 RX ORDER — ACETAMINOPHEN 500 MG
650 TABLET ORAL EVERY 6 HOURS
Refills: 0 | Status: DISCONTINUED | OUTPATIENT
Start: 2024-04-12 | End: 2024-04-26

## 2024-04-12 RX ORDER — LITHIUM CARBONATE 300 MG/1
2 TABLET, EXTENDED RELEASE ORAL
Refills: 0 | DISCHARGE

## 2024-04-12 RX ORDER — SERTRALINE 25 MG/1
75 TABLET, FILM COATED ORAL
Refills: 0 | DISCHARGE

## 2024-04-12 RX ORDER — SODIUM CHLORIDE 9 MG/ML
1000 INJECTION, SOLUTION INTRAVENOUS
Refills: 0 | Status: DISCONTINUED | OUTPATIENT
Start: 2024-04-12 | End: 2024-04-12

## 2024-04-12 RX ORDER — HYDROMORPHONE HYDROCHLORIDE 2 MG/ML
0.5 INJECTION INTRAMUSCULAR; INTRAVENOUS; SUBCUTANEOUS
Refills: 0 | Status: DISCONTINUED | OUTPATIENT
Start: 2024-04-12 | End: 2024-04-12

## 2024-04-12 RX ORDER — LEVOTHYROXINE SODIUM 0.15 MG/1
150 TABLET ORAL
Refills: 0 | Status: ACTIVE | COMMUNITY

## 2024-04-12 RX ORDER — OXYCODONE AND ACETAMINOPHEN 5; 325 MG/1; MG/1
1 TABLET ORAL EVERY 4 HOURS
Refills: 0 | Status: DISCONTINUED | OUTPATIENT
Start: 2024-04-12 | End: 2024-04-12

## 2024-04-12 RX ORDER — LURASIDONE HYDROCHLORIDE 40 MG/1
40 TABLET ORAL
Refills: 0 | DISCHARGE

## 2024-04-12 RX ORDER — LEVOTHYROXINE SODIUM 125 MCG
1 TABLET ORAL
Qty: 0 | Refills: 0 | DISCHARGE
Start: 2024-04-12

## 2024-04-12 RX ORDER — CEFAZOLIN SODIUM 1 G
2000 VIAL (EA) INJECTION ONCE
Refills: 0 | Status: COMPLETED | OUTPATIENT
Start: 2024-04-12 | End: 2024-04-12

## 2024-04-12 RX ADMIN — SODIUM CHLORIDE 75 MILLILITER(S): 9 INJECTION, SOLUTION INTRAVENOUS at 11:16

## 2024-04-12 RX ADMIN — Medication 1 TABLET(S): at 18:32

## 2024-04-12 NOTE — PROGRESS NOTE ADULT - SUBJECTIVE AND OBJECTIVE BOX
General Surgery Post op Check    S/PCompletion Thyroidectomy w R Upper Parathyroidectomy POD 0. Pt seen and examined without complaints. Pain is controlled. Denies SOB/CP/N/V. Ambulating. Passing flatus and voiding freely. Tolerating clears.    Vital Signs Last 24 Hrs  T(C): 36.4 (12 Apr 2024 16:10), Max: 36.7 (12 Apr 2024 06:54)  T(F): 97.5 (12 Apr 2024 16:10), Max: 98.1 (12 Apr 2024 06:54)  HR: 72 (12 Apr 2024 16:10) (63 - 79)  BP: 145/85 (12 Apr 2024 16:10) (98/59 - 145/85)  BP(mean): --  RR: 16 (12 Apr 2024 16:10) (12 - 19)  SpO2: 95% (12 Apr 2024 16:10) (93% - 99%)    Parameters below as of 12 Apr 2024 12:25  Patient On (Oxygen Delivery Method): room air        I&O's Summary    12 Apr 2024 07:01  -  12 Apr 2024 17:03  --------------------------------------------------------  IN: 2345 mL / OUT: 0 mL / NET: 2345 mL        Physical Exam  Gen: NAD, A&Ox3  Neck: Surgical site c/d/i w/ no erythema, exudate or effusion  Pulm: No respiratory distress, no subcostal retractions  CV: RRR, no JVD  Abd: Soft, NT, ND  Drains: Alessio Drain on neck with dark sanguinous output  Extremities:  FROM, warm and well perfused, equal bilateral muscle strength

## 2024-04-12 NOTE — BRIEF OPERATIVE NOTE - NSICDXBRIEFPROCEDURE_GEN_ALL_CORE_FT
PROCEDURES:  Completion thyroidectomy 12-Apr-2024 11:22:42  Mariann Bernal  Parathyroidectomy 12-Apr-2024 11:22:56  Mariann Bernal  Intraoperative parathyroid hormone (PTH) measurement 12-Apr-2024 11:23:06  Mariann Bernal  Thyroidectomy with nerve integrity monitoring 12-Apr-2024 11:23:24  Mariann Bernal

## 2024-04-12 NOTE — ASU DISCHARGE PLAN (ADULT/PEDIATRIC) - NS MD DC FALL RISK RISK
For information on Fall & Injury Prevention, visit: [email or web address] OR  [email or web address] OR  https://www.cdc.gov/steadi/patient.html

## 2024-04-12 NOTE — BRIEF OPERATIVE NOTE - FIRST ASSIST
Resident/Fellow mental status of hyperkalemia-nephrology evaluation    Hypertension-continue home medications    Hyperlipidemia-statin    CAD stent-stable    Diabetes type 2-hold Lantus and monitor blood sugar with low correction scale insulin hemoglobin A1c    History of DVT-off anticoagulation now    Right heel ulcer-wound care        DVT Prophylaxis: Heparin  Diet: Clear liquid diet advance as tolerated  Code Status: Full    PT/OT Eval Status:     Dispo -admitted to Bella Winston MD    Thank you Eugene Patel MD for the opportunity to be involved in this patient's care. If you have any questions or concerns please feel free to contact me at [phone].

## 2024-04-12 NOTE — ASU DISCHARGE PLAN (ADULT/PEDIATRIC) - CARE PROVIDER_API CALL
Tex Garcia  Surgery  [address], [address]  [place] [address]  Phone: [phone]  Fax: [phone]  Established Patient  Follow Up Time: 1 week

## 2024-04-12 NOTE — ASU DISCHARGE PLAN (ADULT/PEDIATRIC) - ASU DC SPECIAL INSTRUCTIONSFT
Drain in place, will likely pull out before discharge, but if still in place please empty daily and measure output.    Do not lift greater than 10 lb for 6 wks. Can shower, return to regular activities. Do not scrub vigorously the surgical wound. Steristrips will fall off on their own.    You have been prescribed Synthroid 150 mg to take daily and Calcium 1250 mg to take 2x daily.    Follow up with Dr. Garcia in clinic in 1 week, call office to schedule appointment. -Leave Steri-strips (tapes) on.  Some blood staining on the tape is normal.    -Okay to shower 48 hours after surgery (Russ morning).  Keep showers short.  No baths or soaking the incision.  Remember to gently towel dry over the incision to avoid rubbing off the Steri-strips.    -Start gentle neck exercises after 72 hours.  Look all the way to the right and left and then let your head roll all the way around.  Do this 10 times in a row at least 3 times a day.    -You may take Tylenol.  After 24 hours it is okay to take Ibuprofen.    -Remember that it is expected that your calcium level may be low after total thyroidectomy. You should take 1000 mg of over-the-counter calcium THREE TIMES DAILY starting tonight. You may take Citracal, TUMS, Viactiv, or any supplement that has 500-600 mg elemental calcium per pill. If you begin experiencing symptoms of low calcium, such as numbness or tingling in your fingertips or around your mouth, immediately take an extra 1000 mg of elemental calcium.    -Take Synthroid ONCE DAILY as prescribed starting tomorrow morning. Make sure to take your Synthroid first thing in the morning and on an empty stomach. Avoid taking Synthroid within 1 hour of taking Calcium or other medications.

## 2024-04-12 NOTE — PROGRESS NOTE ADULT - ASSESSMENT
A/P: 48y Female s/p Completion Thyroidectomy and R Upper Parathyroidectomy POD 0. Doing well postoperatively.    - Admit for 23 hr observation, DC tmrw AM  - Pain Control (no NSAIDs)  -DVT prophylaxis w/ SCD.  Encouraged OOB. No Anticoagulation  -Strict I&O's  -Monitor PJ drain output  -Analgesia and antiemetics as needed  -Regular Diet  -Monitor overnight  - Started synthroid and calcium supplementation  - FU AM labs (CBC, BMP, Vitamin D levels, Calcium)  -Dispo:  Discharge home in AM

## 2024-04-13 ENCOUNTER — TRANSCRIPTION ENCOUNTER (OUTPATIENT)
Age: 49
End: 2024-04-13

## 2024-04-13 VITALS
DIASTOLIC BLOOD PRESSURE: 96 MMHG | TEMPERATURE: 99 F | HEART RATE: 70 BPM | RESPIRATION RATE: 18 BRPM | SYSTOLIC BLOOD PRESSURE: 150 MMHG | OXYGEN SATURATION: 95 %

## 2024-04-13 DIAGNOSIS — N28.9 DISORDER OF KIDNEY AND URETER, UNSPECIFIED: ICD-10-CM

## 2024-04-13 LAB
24R-OH-CALCIDIOL SERPL-MCNC: 40.6 NG/ML — SIGNIFICANT CHANGE UP (ref 30–80)
ANION GAP SERPL CALC-SCNC: 10 MMOL/L — SIGNIFICANT CHANGE UP (ref 5–17)
BUN SERPL-MCNC: 14 MG/DL — SIGNIFICANT CHANGE UP (ref 7–23)
CALCIUM SERPL-MCNC: 8.7 MG/DL — SIGNIFICANT CHANGE UP (ref 8.5–10.1)
CALCIUM SERPL-MCNC: 9.5 MG/DL — SIGNIFICANT CHANGE UP (ref 8.4–10.5)
CHLORIDE SERPL-SCNC: 113 MMOL/L — HIGH (ref 96–108)
CO2 SERPL-SCNC: 24 MMOL/L — SIGNIFICANT CHANGE UP (ref 22–31)
CREAT SERPL-MCNC: 1.3 MG/DL — SIGNIFICANT CHANGE UP (ref 0.5–1.3)
EGFR: 50 ML/MIN/1.73M2 — LOW
GLUCOSE SERPL-MCNC: 132 MG/DL — HIGH (ref 70–99)
MAGNESIUM SERPL-MCNC: 2 MG/DL — SIGNIFICANT CHANGE UP (ref 1.6–2.6)
POTASSIUM SERPL-MCNC: 3.4 MMOL/L — LOW (ref 3.5–5.3)
POTASSIUM SERPL-SCNC: 3.4 MMOL/L — LOW (ref 3.5–5.3)
PTH-INTACT FLD-MCNC: 34 PG/ML — SIGNIFICANT CHANGE UP (ref 15–65)
SODIUM SERPL-SCNC: 147 MMOL/L — HIGH (ref 135–145)

## 2024-04-13 PROCEDURE — 83970 ASSAY OF PARATHORMONE: CPT

## 2024-04-13 PROCEDURE — C1889: CPT

## 2024-04-13 PROCEDURE — 83735 ASSAY OF MAGNESIUM: CPT

## 2024-04-13 PROCEDURE — 82306 VITAMIN D 25 HYDROXY: CPT

## 2024-04-13 PROCEDURE — 82310 ASSAY OF CALCIUM: CPT

## 2024-04-13 PROCEDURE — 80048 BASIC METABOLIC PNL TOTAL CA: CPT

## 2024-04-13 PROCEDURE — 88305 TISSUE EXAM BY PATHOLOGIST: CPT

## 2024-04-13 PROCEDURE — 36415 COLL VENOUS BLD VENIPUNCTURE: CPT

## 2024-04-13 PROCEDURE — 88307 TISSUE EXAM BY PATHOLOGIST: CPT

## 2024-04-13 PROCEDURE — 86901 BLOOD TYPING SEROLOGIC RH(D): CPT

## 2024-04-13 PROCEDURE — 86900 BLOOD TYPING SEROLOGIC ABO: CPT

## 2024-04-13 PROCEDURE — 60260 REPEAT THYROID SURGERY: CPT | Mod: RT

## 2024-04-13 PROCEDURE — 88331 PATH CONSLTJ SURG 1 BLK 1SPC: CPT

## 2024-04-13 PROCEDURE — 86850 RBC ANTIBODY SCREEN: CPT

## 2024-04-13 RX ORDER — LEVOTHYROXINE SODIUM 125 MCG
1 TABLET ORAL
Qty: 30 | Refills: 1
Start: 2024-04-13

## 2024-04-13 RX ADMIN — Medication 150 MICROGRAM(S): at 05:36

## 2024-04-13 RX ADMIN — Medication 1 TABLET(S): at 06:46

## 2024-04-13 NOTE — DISCHARGE NOTE NURSING/CASE MANAGEMENT/SOCIAL WORK - PATIENT PORTAL LINK FT
You can access the FollowMyHealth Patient Portal offered by [facility] by registering at the following website: [email or web address]. By joining [facility]’s FollowMyHealth portal, you will also be able to view your health information using other applications (apps) compatible with our system.

## 2024-04-13 NOTE — PROGRESS NOTE ADULT - NSPROGADDITIONALINFOA_GEN_ALL_CORE
.  .  Endocrine Surgery Attending Statement    Patient seen and examined with the surgical PA and her  at bedside.  She states that she is feeling well and denies pain, dysphagia, voice change, or paresthesias.  Her voice is strong and clear.  The neck is flat and the incision is clean with intact Steri-Strips.  The surgical drain had a small amount of serosanguinous fluid and was removed.  A clean dressing was applied to the drain site.   AM serum calcium = 8.7.  Continue with current regimen of PO calcium supplementation.  She is stable for discharge home today.  Post-op instructions and follow-up information were reviewed with the patient.

## 2024-04-14 DIAGNOSIS — Z86.39 PERSONAL HISTORY OF OTHER ENDOCRINE, NUTRITIONAL AND METABOLIC DISEASE: ICD-10-CM

## 2024-04-14 DIAGNOSIS — E83.81 HUNGRY BONE SYNDROME: ICD-10-CM

## 2024-04-16 ENCOUNTER — NON-APPOINTMENT (OUTPATIENT)
Age: 49
End: 2024-04-16

## 2024-04-18 ENCOUNTER — APPOINTMENT (OUTPATIENT)
Dept: SURGERY | Facility: CLINIC | Age: 49
End: 2024-04-18
Payer: COMMERCIAL

## 2024-04-18 VITALS
SYSTOLIC BLOOD PRESSURE: 173 MMHG | HEIGHT: 65 IN | WEIGHT: 186 LBS | HEART RATE: 64 BPM | BODY MASS INDEX: 30.99 KG/M2 | OXYGEN SATURATION: 99 % | DIASTOLIC BLOOD PRESSURE: 107 MMHG

## 2024-04-18 VITALS — SYSTOLIC BLOOD PRESSURE: 154 MMHG | DIASTOLIC BLOOD PRESSURE: 100 MMHG

## 2024-04-18 DIAGNOSIS — Z98.890 OTHER SPECIFIED POSTPROCEDURAL STATES: ICD-10-CM

## 2024-04-18 DIAGNOSIS — C73 MALIGNANT NEOPLASM OF THYROID GLAND: ICD-10-CM

## 2024-04-18 DIAGNOSIS — Z09 ENCOUNTER FOR FOLLOW-UP EXAMINATION AFTER COMPLETED TREATMENT FOR CONDITIONS OTHER THAN MALIGNANT NEOPLASM: ICD-10-CM

## 2024-04-18 PROCEDURE — 99024 POSTOP FOLLOW-UP VISIT: CPT

## 2024-04-18 NOTE — PHYSICAL EXAM
[Midline] : located in midline position [Normal] : orientation to person, place, and time: normal [de-identified] : There is slight fullness above the incision secondary to induration and a trace seroma.  The incision and drain site are clean and healing well.  The Steri-strips were changed. [de-identified] : Extremities: FITZPATRICK x 4.   Skin: No obvious skin lesions.   Voice: strong and clear

## 2024-04-18 NOTE — ASSESSMENT
[FreeTextEntry1] : Assessment: 48-year-old woman, with history significant for Lithium use, a left-sided invasive oncocytic cell thyroid carcinoma, a trans-cervical partial thymectomy with excision of intra-thymic parathyroid lesion, and a resolving idiopathic left vocal cord paresis, presents for post-op follow-up status post completion right thyroid lobectomy and right superior parathyroidectomy.  Plan: - The surgical pathology from the completion thyroidectomy was reviewed with Mrs. Chavis and I explained that her right thyroid lobe contained benign adenomatous nodules with calcifications.  The excised right superior parathyroid gland revealed mildly hypercellular parathyroid tissue. - Considering that she had extensive capsular invasion as well as focal angioinvasion within her left-sided tumor, I explained that she will require at the very least a whole-body scan and possibly adjuvant OROPEZA.  She states that she will be seeing a new endocrinologist at [facility].  In the interim, she was instructed to continue with her current dose of Synthroid. - I then explained that her postoperative day #1 serum calcium, iPTH, and vitamin D levels were all normal.  I have recommended that she remain once daily calcium supplementation.   - Finally, I instructed her to keep the incision covered and change the Steri-Strips as necessary for a total of 1-month post-op.  Methods for optimal scar care were reviewed. - She was instructed to follow-up with me at six-months post-op or in the interim PRN.

## 2024-04-18 NOTE — HISTORY OF PRESENT ILLNESS
[de-identified] : Mrs. Chavis presents for post-operative follow-up, status post completion right thyroid lobectomy and right superior parathyroidectomy on 04/12/2024.  Postoperative day #1 labs revealed a serum calcium = 9.5, iPTH = 34, and 25-OH vitamin D = 40.6.  She states that she has been doing well and denies significant pain or dysphagia.  She states that her voice is almost completely back to normal.  She has been taking 600 mg of calcium three times daily and denies any paresthesias.

## 2024-04-29 ENCOUNTER — APPOINTMENT (OUTPATIENT)
Dept: NEUROLOGY | Facility: CLINIC | Age: 49
End: 2024-04-29

## 2024-05-06 ENCOUNTER — APPOINTMENT (OUTPATIENT)
Dept: OTOLARYNGOLOGY | Facility: CLINIC | Age: 49
End: 2024-05-06

## 2024-05-09 ENCOUNTER — OUTPATIENT (OUTPATIENT)
Dept: OUTPATIENT SERVICES | Facility: HOSPITAL | Age: 49
LOS: 1 days | End: 2024-05-09
Payer: COMMERCIAL

## 2024-05-09 ENCOUNTER — APPOINTMENT (OUTPATIENT)
Dept: ULTRASOUND IMAGING | Facility: CLINIC | Age: 49
End: 2024-05-09
Payer: COMMERCIAL

## 2024-05-09 ENCOUNTER — RESULT REVIEW (OUTPATIENT)
Age: 49
End: 2024-05-09

## 2024-05-09 ENCOUNTER — APPOINTMENT (OUTPATIENT)
Dept: MAMMOGRAPHY | Facility: CLINIC | Age: 49
End: 2024-05-09
Payer: COMMERCIAL

## 2024-05-09 DIAGNOSIS — R92.30 DENSE BREASTS, UNSPECIFIED: ICD-10-CM

## 2024-05-09 DIAGNOSIS — E89.0 POSTPROCEDURAL HYPOTHYROIDISM: Chronic | ICD-10-CM

## 2024-05-09 DIAGNOSIS — Z98.890 OTHER SPECIFIED POSTPROCEDURAL STATES: Chronic | ICD-10-CM

## 2024-05-09 DIAGNOSIS — Z12.39 ENCOUNTER FOR OTHER SCREENING FOR MALIGNANT NEOPLASM OF BREAST: ICD-10-CM

## 2024-05-09 PROCEDURE — 77063 BREAST TOMOSYNTHESIS BI: CPT

## 2024-05-09 PROCEDURE — 77063 BREAST TOMOSYNTHESIS BI: CPT | Mod: 26

## 2024-05-09 PROCEDURE — 76641 ULTRASOUND BREAST COMPLETE: CPT

## 2024-05-09 PROCEDURE — 77067 SCR MAMMO BI INCL CAD: CPT | Mod: 26

## 2024-05-09 PROCEDURE — 76641 ULTRASOUND BREAST COMPLETE: CPT | Mod: 26,50

## 2024-05-09 PROCEDURE — 77067 SCR MAMMO BI INCL CAD: CPT

## 2024-06-13 ENCOUNTER — APPOINTMENT (OUTPATIENT)
Dept: SURGERY | Facility: CLINIC | Age: 49
End: 2024-06-13

## 2024-08-14 NOTE — ASU PREOP CHECKLIST - ALLERGY BAND ON
normal appearance , without tenderness upon palpation , no deformities , trachea midline , Thyroid normal size , no masses , thyroid nontender no known allergies

## 2024-09-19 NOTE — PATIENT PROFILE ADULT - NSPRESCRALCFREQ_GEN_A_NUR
Consent: The patient's verbal consent was obtained including but not limited to risks of crusting, scabbing, blistering, scarring, darker or lighter pigmentary change, recurrence, incomplete removal and infection.
Detail Level: Zone
Render Post-Care Instructions In Note?: yes
Render Note In Bullet Format When Appropriate: No
Number Of Freeze-Thaw Cycles: 2 freeze-thaw cycles
Duration Of Freeze Thaw-Cycle (Seconds): 5
Post-Care Instructions: I reviewed with the patient in detail post-care instructions. Patient is to wear sunprotection, and avoid picking at any of the treated lesions. Pt may apply Vaseline to crusted or scabbing areas.
Spray Paint Text: The liquid nitrogen was applied to the skin utilizing a spray paint frosting technique.
Medical Necessity Information: It is in your best interest to select a reason for this procedure from the list below. All of these items fulfill various CMS LCD requirements except the new and changing color options.
Medical Necessity Clause: This procedure was medically necessary because the lesions that were treated were:  patient picks at lesions
Detail Level: Simple
Never

## 2024-10-07 ENCOUNTER — OFFICE (OUTPATIENT)
Dept: URBAN - METROPOLITAN AREA CLINIC 113 | Facility: CLINIC | Age: 49
Setting detail: OPHTHALMOLOGY
End: 2024-10-07
Payer: COMMERCIAL

## 2024-10-07 DIAGNOSIS — H52.13: ICD-10-CM

## 2024-10-07 DIAGNOSIS — H25.013: ICD-10-CM

## 2024-10-07 DIAGNOSIS — H11.153: ICD-10-CM

## 2024-10-07 PROCEDURE — 92014 COMPRE OPH EXAM EST PT 1/>: CPT | Performed by: OPTOMETRIST

## 2024-10-07 PROCEDURE — CLRNW CONTACT LENS RENEWAL- NO LENS CHANGE: Performed by: OPTOMETRIST

## 2024-10-07 ASSESSMENT — REFRACTION_CURRENTRX
OS_VPRISM_DIRECTION: SV
OS_CYLINDER: -1.25
OS_OVR_VA: 20/
OS_AXIS: 085
OD_CYLINDER: -0.75
OD_AXIS: 180
OS_OVR_VA: 20/
OD_CYLINDER: -0.25
OD_SPHERE: -6.50
OD_SPHERE: -6.00
OD_OVR_VA: 20/
OD_VPRISM_DIRECTION: SV
OS_SPHERE: -5.50
OD_OVR_VA: 20/
OD_AXIS: 095
OS_SPHERE: -5.00
OS_CYLINDER: SPH

## 2024-10-07 ASSESSMENT — REFRACTION_AUTOREFRACTION
OS_AXIS: 081
OD_SPHERE: -8.00
OD_CYLINDER: -0.75
OD_AXIS: 080
OS_CYLINDER: -1.00
OS_SPHERE: -6.25

## 2024-10-07 ASSESSMENT — CONFRONTATIONAL VISUAL FIELD TEST (CVF)
OD_FINDINGS: FULL
OS_FINDINGS: FULL

## 2024-10-07 ASSESSMENT — KERATOMETRY
OD_AXISANGLE_DEGREES: 033
OS_AXISANGLE_DEGREES: 156
OD_K2POWER_DIOPTERS: 42.75
OS_K1POWER_DIOPTERS: 42.75
OS_K2POWER_DIOPTERS: 43.00
OD_K1POWER_DIOPTERS: 42.25

## 2024-10-07 ASSESSMENT — REFRACTION_MANIFEST
OS_CYLINDER: -1.00
OD_CYLINDER: -0.75
OD_CYLINDER: -0.50
OS_VA1: 20/20
OS_AXIS: 080
OS_CYLINDER: -1.25
OS_SPHERE: -6.25
OS_VA1: 20/20
OD_VA1: 20/20
OD_AXIS: 080
OD_SPHERE: -7.50
OS_AXIS: 075
OD_SPHERE: -7.00
OD_AXIS: 080
OD_VA1: 20/20
OD_ADD: +1.75
OS_SPHERE: -6.50
OS_ADD: +1.75

## 2024-10-07 ASSESSMENT — VISUAL ACUITY
OD_BCVA: 20/40
OS_BCVA: 20/25-2

## 2024-10-07 ASSESSMENT — TONOMETRY
OS_IOP_MMHG: 18
OD_IOP_MMHG: 13

## 2024-10-24 ENCOUNTER — APPOINTMENT (OUTPATIENT)
Dept: SURGERY | Facility: CLINIC | Age: 49
End: 2024-10-24

## 2024-11-15 ENCOUNTER — APPOINTMENT (OUTPATIENT)
Dept: NEUROLOGY | Facility: CLINIC | Age: 49
End: 2024-11-15
Payer: COMMERCIAL

## 2024-11-15 VITALS
BODY MASS INDEX: 33.32 KG/M2 | WEIGHT: 200 LBS | HEIGHT: 65 IN | HEART RATE: 71 BPM | DIASTOLIC BLOOD PRESSURE: 74 MMHG | OXYGEN SATURATION: 99 % | SYSTOLIC BLOOD PRESSURE: 118 MMHG

## 2024-11-15 DIAGNOSIS — G40.109 LOCALIZATION-RELATED (FOCAL) (PARTIAL) SYMPTOMATIC EPILEPSY AND EPILEPTIC SYNDROMES WITH SIMPLE PARTIAL SEIZURES, NOT INTRACTABLE, W/OUT STATUS EPILEPTICUS: ICD-10-CM

## 2024-11-15 PROCEDURE — 99214 OFFICE O/P EST MOD 30 MIN: CPT

## 2024-11-15 RX ORDER — LITHIUM CARBONATE 600 MG/1
600 CAPSULE ORAL
Refills: 0 | Status: ACTIVE | COMMUNITY

## 2024-11-15 RX ORDER — LAMOTRIGINE 100 MG/1
100 TABLET ORAL
Qty: 180 | Refills: 3 | Status: ACTIVE | COMMUNITY
Start: 1900-01-01 | End: 1900-01-01

## 2024-11-15 RX ORDER — LURASIDONE HYDROCHLORIDE 40 MG/1
40 TABLET, FILM COATED ORAL
Refills: 0 | Status: ACTIVE | COMMUNITY

## 2025-02-03 ENCOUNTER — RX RENEWAL (OUTPATIENT)
Age: 50
End: 2025-02-03

## 2025-02-13 NOTE — ED ADULT TRIAGE NOTE - MODE OF ARRIVAL
Can you find out why her insurance will not cover her injections?  She has been using samples of Orencia and is out.  Her infusion copay is [phone].  Thanks   [facility]

## 2025-04-28 ENCOUNTER — APPOINTMENT (OUTPATIENT)
Dept: OBGYN | Facility: CLINIC | Age: 50
End: 2025-04-28
Payer: COMMERCIAL

## 2025-04-28 VITALS
SYSTOLIC BLOOD PRESSURE: 126 MMHG | BODY MASS INDEX: 35.65 KG/M2 | DIASTOLIC BLOOD PRESSURE: 86 MMHG | HEIGHT: 65 IN | WEIGHT: 214 LBS

## 2025-04-28 DIAGNOSIS — Z00.00 ENCOUNTER FOR GENERAL ADULT MEDICAL EXAMINATION W/OUT ABNORMAL FINDINGS: ICD-10-CM

## 2025-04-28 PROCEDURE — 99396 PREV VISIT EST AGE 40-64: CPT

## 2025-05-02 LAB — HPV HIGH+LOW RISK DNA PNL CVX: NORMAL

## 2025-05-07 ENCOUNTER — NON-APPOINTMENT (OUTPATIENT)
Age: 50
End: 2025-05-07

## 2025-05-07 ENCOUNTER — APPOINTMENT (OUTPATIENT)
Dept: NEUROLOGY | Facility: CLINIC | Age: 50
End: 2025-05-07
Payer: COMMERCIAL

## 2025-05-07 VITALS
OXYGEN SATURATION: 98 % | WEIGHT: 208 LBS | HEIGHT: 65 IN | BODY MASS INDEX: 34.66 KG/M2 | DIASTOLIC BLOOD PRESSURE: 76 MMHG | SYSTOLIC BLOOD PRESSURE: 106 MMHG | HEART RATE: 93 BPM

## 2025-05-07 DIAGNOSIS — G40.109 LOCALIZATION-RELATED (FOCAL) (PARTIAL) SYMPTOMATIC EPILEPSY AND EPILEPTIC SYNDROMES WITH SIMPLE PARTIAL SEIZURES, NOT INTRACTABLE, W/OUT STATUS EPILEPTICUS: ICD-10-CM

## 2025-05-07 PROCEDURE — 99214 OFFICE O/P EST MOD 30 MIN: CPT

## 2025-05-07 RX ORDER — CLONAZEPAM 0.5 MG/1
0.5 TABLET ORAL
Refills: 0 | Status: ACTIVE | COMMUNITY

## 2025-05-07 RX ORDER — HYDRALAZINE HYDROCHLORIDE 25 MG/1
25 TABLET ORAL
Refills: 0 | Status: ACTIVE | COMMUNITY

## 2025-06-02 ENCOUNTER — RESULT REVIEW (OUTPATIENT)
Age: 50
End: 2025-06-02

## 2025-06-02 ENCOUNTER — APPOINTMENT (OUTPATIENT)
Dept: MAMMOGRAPHY | Facility: CLINIC | Age: 50
End: 2025-06-02
Payer: COMMERCIAL

## 2025-06-02 ENCOUNTER — OUTPATIENT (OUTPATIENT)
Dept: OUTPATIENT SERVICES | Facility: HOSPITAL | Age: 50
LOS: 1 days | End: 2025-06-02
Payer: COMMERCIAL

## 2025-06-02 ENCOUNTER — APPOINTMENT (OUTPATIENT)
Dept: ULTRASOUND IMAGING | Facility: CLINIC | Age: 50
End: 2025-06-02
Payer: COMMERCIAL

## 2025-06-02 DIAGNOSIS — Z12.39 ENCOUNTER FOR OTHER SCREENING FOR MALIGNANT NEOPLASM OF BREAST: ICD-10-CM

## 2025-06-02 DIAGNOSIS — Z98.890 OTHER SPECIFIED POSTPROCEDURAL STATES: Chronic | ICD-10-CM

## 2025-06-02 DIAGNOSIS — E89.0 POSTPROCEDURAL HYPOTHYROIDISM: Chronic | ICD-10-CM

## 2025-06-02 PROCEDURE — 77067 SCR MAMMO BI INCL CAD: CPT | Mod: 26

## 2025-06-02 PROCEDURE — 77063 BREAST TOMOSYNTHESIS BI: CPT

## 2025-06-02 PROCEDURE — 77063 BREAST TOMOSYNTHESIS BI: CPT | Mod: 26

## 2025-06-02 PROCEDURE — 77067 SCR MAMMO BI INCL CAD: CPT

## 2025-06-02 PROCEDURE — 76641 ULTRASOUND BREAST COMPLETE: CPT | Mod: 26,50

## 2025-06-02 PROCEDURE — 76641 ULTRASOUND BREAST COMPLETE: CPT

## 2025-06-03 ENCOUNTER — RX RENEWAL (OUTPATIENT)
Age: 50
End: 2025-06-03

## 2025-08-06 ENCOUNTER — RX RENEWAL (OUTPATIENT)
Age: 50
End: 2025-08-06

## (undated) DEVICE — SOL IRR POUR H2O 1000ML

## (undated) DEVICE — Device

## (undated) DEVICE — DRSG TELFA 3 X 8

## (undated) DEVICE — SUT POLYSORB 3-0 30" V-20 UNDYED

## (undated) DEVICE — WARMING BLANKET LOWER ADULT

## (undated) DEVICE — VENODYNE/SCD SLEEVE CALF MEDIUM

## (undated) DEVICE — SOL IRR POUR NS 0.9% 1000ML

## (undated) DEVICE — DRAPE INSTRUMENT POUCH 6.75" X 11"

## (undated) DEVICE — PREP CHLORAPREP HI-LITE ORANGE 26ML

## (undated) DEVICE — SUT ETHILON 2-0 18" FS

## (undated) DEVICE — SUT VICRYL PLUS 2-0 18" CT-1 (POP-OFF)

## (undated) DEVICE — ELCTR BOVIE PENCIL SMOKE EVACUATION

## (undated) DEVICE — LIGASURE SMALL JAW

## (undated) DEVICE — SOL IRR POUR H2O 1500ML

## (undated) DEVICE — CANISTER DISPOSABLE THIN WALL 3000CC

## (undated) DEVICE — TUBING FOR SMOKE EVACUATOR (PURPLE END)

## (undated) DEVICE — SUT VICRYL 3-0 27" SH UNDYED

## (undated) DEVICE — SUT POLYSORB 3-0 60" REEL

## (undated) DEVICE — DRSG MASTISOL

## (undated) DEVICE — STAPLER SKIN VISI-STAT 35 WIDE

## (undated) DEVICE — DRAIN RESERVOIR FOR JACKSON PRATT 100CC CARDINAL

## (undated) DEVICE — SUT SILK 3-0 30" SH

## (undated) DEVICE — DRAPE IOBAN 33" X 23"

## (undated) DEVICE — DRSG TEGADERM 4X4.75"

## (undated) DEVICE — SUT VICRYL 1 36" CTX UNDYED

## (undated) DEVICE — ELCTR MONOPOLAR STIMULATOR PROBE FLUSH-TIP

## (undated) DEVICE — POSITIONER FOAM SLOTTED HEAD CRADLE (PINK)

## (undated) DEVICE — DRAIN BLAKE 10FR ROUND

## (undated) DEVICE — SUT MONOCRYL 4-0 27" PS-2 UNDYED

## (undated) DEVICE — DRAPE MAGNETIC INSTRUMENT MEDIUM

## (undated) DEVICE — SAW BLADE MICROAIRE STERNUM 1X34X9.4MM

## (undated) DEVICE — MARKING PEN W RULER

## (undated) DEVICE — PLV/PSP-ESU T7E14761DX: Type: DURABLE MEDICAL EQUIPMENT

## (undated) DEVICE — SUT SILK 3-0 18" TIES

## (undated) DEVICE — ELCTR BOVIE PENCIL BLADE 10FT

## (undated) DEVICE — SUT MONOCRYL 4-0 18" PS-2

## (undated) DEVICE — GLV 7.5 PROTEXIS (WHITE)

## (undated) DEVICE — PROTECTOR HEEL / ELBOW FLUFFY

## (undated) DEVICE — PACK MAJOR ABDOMINAL WITH LAP

## (undated) DEVICE — PACK MINOR WITH LAP

## (undated) DEVICE — ELCTR GROUNDING PAD ADULT COVIDIEN

## (undated) DEVICE — SOL IRR POUR H2O 500ML

## (undated) DEVICE — DRSG STERISTRIPS 0.5 X 4"

## (undated) DEVICE — SPECIMEN CONTAINER 4OZ

## (undated) DEVICE — DURABLE MEDICAL EQUIPMENT: Type: DURABLE MEDICAL EQUIPMENT

## (undated) DEVICE — DRAPE TOWEL BLUE 17" X 24"

## (undated) DEVICE — SUT SOFSILK 2-0 18" C-15

## (undated) DEVICE — TRACHEOSTOMY TRAY PEDIATRIC DISP

## (undated) DEVICE — ELCTR BOVIE TIP BLADE INSULATED 6.5" EDGE

## (undated) DEVICE — DRSG BENZOIN 0.6CC

## (undated) DEVICE — SUT SOFSILK 3-0 12X18" TIES

## (undated) DEVICE — BLADE SCALPEL SAFETYLOCK #15

## (undated) DEVICE — NDL HYPO REGULAR BEVEL 25G X 1.5" (BLUE)

## (undated) DEVICE — VENODYNE/SCD SLEEVE CALF LARGE

## (undated) DEVICE — SUT PDS II 1 96" XLH

## (undated) DEVICE — SOL IRR POUR NS 0.9% 500ML

## (undated) DEVICE — SUT POLYSORB 2-0 30" V-20 UNDYED

## (undated) DEVICE — POSITIONER FOAM HEAD CRADLE (PINK)

## (undated) DEVICE — PACK HEAD & NECK

## (undated) DEVICE — DRAPE GENERAL ENDOSCOPY

## (undated) DEVICE — POSITIONER STRAP ARMBOARD VELCRO TS-30

## (undated) DEVICE — DRSG TELFA 1 X 3

## (undated) DEVICE — PREP BETADINE SPONGE STICKS

## (undated) DEVICE — DRAPE THYROID 77" X 123"

## (undated) DEVICE — ELCTR BOVIE BLADE 3/4" EXTENDED LENGTH 6"

## (undated) DEVICE — GLV 7.5 PROTEXIS (BLUE)

## (undated) DEVICE — VISITEC 4X4

## (undated) DEVICE — SPONGE PEANUT AUTO COUNT

## (undated) DEVICE — LABELS BLANK W PEN

## (undated) DEVICE — DRSG CURITY GAUZE SPONGE 4 X 4" 12-PLY

## (undated) DEVICE — DRAPE LARGE SHEET 72X85"

## (undated) DEVICE — DRAPE 3/4 SHEET 52X76"

## (undated) DEVICE — CHEST DRAIN PLEUR-EVAC DRY/WET ADULT-PEDS SINGLE (QUICK)

## (undated) DEVICE — ELCTR BOVIE TIP BLADE INSULATED 2.75" EDGE

## (undated) DEVICE — TUBING SUCTION NONCONDUCTIVE 6MM X 12FT

## (undated) DEVICE — DRSG TEGADERM 2.5X3"

## (undated) DEVICE — PREP ALCOHOL PAD MED

## (undated) DEVICE — POSITIONER FOAM EGG CRATE ULNAR 2PCS (PINK)

## (undated) DEVICE — FOLEY TRAY 16FR 5CC LF UMETER CLOSED

## (undated) DEVICE — SUT POLYSORB 2-0 60" REEL

## (undated) DEVICE — BLADE STERILIZING

## (undated) DEVICE — SUT SILK 2-0 30" SH